# Patient Record
Sex: FEMALE | Race: OTHER | HISPANIC OR LATINO | Employment: UNEMPLOYED | URBAN - METROPOLITAN AREA
[De-identification: names, ages, dates, MRNs, and addresses within clinical notes are randomized per-mention and may not be internally consistent; named-entity substitution may affect disease eponyms.]

---

## 2018-07-16 ENCOUNTER — TELEPHONE (OUTPATIENT)
Dept: FAMILY MEDICINE CLINIC | Facility: CLINIC | Age: 48
End: 2018-07-16

## 2018-07-16 DIAGNOSIS — Z12.31 ENCOUNTER FOR SCREENING MAMMOGRAM FOR MALIGNANT NEOPLASM OF BREAST: Primary | ICD-10-CM

## 2018-07-17 DIAGNOSIS — Z12.31 SCREENING MAMMOGRAM, ENCOUNTER FOR: Primary | ICD-10-CM

## 2018-07-18 ENCOUNTER — TELEPHONE (OUTPATIENT)
Dept: FAMILY MEDICINE CLINIC | Facility: CLINIC | Age: 48
End: 2018-07-18

## 2018-07-23 ENCOUNTER — TRANSCRIBE ORDERS (OUTPATIENT)
Dept: ADMINISTRATIVE | Facility: HOSPITAL | Age: 48
End: 2018-07-23

## 2018-07-23 ENCOUNTER — OFFICE VISIT (OUTPATIENT)
Dept: OBGYN CLINIC | Facility: CLINIC | Age: 48
End: 2018-07-23
Payer: COMMERCIAL

## 2018-07-23 ENCOUNTER — APPOINTMENT (OUTPATIENT)
Dept: LAB | Facility: HOSPITAL | Age: 48
End: 2018-07-23
Attending: OBSTETRICS & GYNECOLOGY
Payer: COMMERCIAL

## 2018-07-23 VITALS
DIASTOLIC BLOOD PRESSURE: 66 MMHG | SYSTOLIC BLOOD PRESSURE: 118 MMHG | BODY MASS INDEX: 27.01 KG/M2 | WEIGHT: 137.6 LBS | HEIGHT: 60 IN

## 2018-07-23 DIAGNOSIS — Z12.4 CERVICAL CANCER SCREENING: ICD-10-CM

## 2018-07-23 DIAGNOSIS — Z12.31 ENCOUNTER FOR SCREENING MAMMOGRAM FOR BREAST CANCER: ICD-10-CM

## 2018-07-23 DIAGNOSIS — N92.1 MENORRHAGIA WITH IRREGULAR CYCLE: ICD-10-CM

## 2018-07-23 DIAGNOSIS — R23.2 HOT FLASHES: ICD-10-CM

## 2018-07-23 DIAGNOSIS — Z11.51 ENCOUNTER FOR SCREENING FOR HUMAN PAPILLOMAVIRUS (HPV): ICD-10-CM

## 2018-07-23 DIAGNOSIS — N64.4 PAIN OF RIGHT BREAST: ICD-10-CM

## 2018-07-23 DIAGNOSIS — Z01.419 ENCOUNTER FOR GYNECOLOGICAL EXAMINATION (GENERAL) (ROUTINE) WITHOUT ABNORMAL FINDINGS: ICD-10-CM

## 2018-07-23 LAB
B-HCG SERPL-ACNC: <3 MIU/ML
ERYTHROCYTE [DISTWIDTH] IN BLOOD BY AUTOMATED COUNT: 13 %
FSH SERPL-ACNC: 2.7 MIU/ML
HCT VFR BLD AUTO: 40.3 % (ref 36–46)
HGB BLD-MCNC: 13.7 G/DL (ref 12–16)
MCH RBC QN AUTO: 30.6 PG (ref 26–34)
MCHC RBC AUTO-ENTMCNC: 33.9 G/DL (ref 31–36)
MCV RBC AUTO: 90 FL (ref 80–100)
PLATELET # BLD AUTO: 240 THOUSANDS/UL (ref 150–450)
PMV BLD AUTO: 8.8 FL (ref 8.9–12.7)
RBC # BLD AUTO: 4.47 MILLION/UL (ref 4–5.2)
TSH SERPL DL<=0.05 MIU/L-ACNC: 0.73 UIU/ML (ref 0.47–4.68)
WBC # BLD AUTO: 5 THOUSAND/UL (ref 4.5–11)

## 2018-07-23 PROCEDURE — 83001 ASSAY OF GONADOTROPIN (FSH): CPT

## 2018-07-23 PROCEDURE — 85027 COMPLETE CBC AUTOMATED: CPT

## 2018-07-23 PROCEDURE — S0610 ANNUAL GYNECOLOGICAL EXAMINA: HCPCS | Performed by: OBSTETRICS & GYNECOLOGY

## 2018-07-23 PROCEDURE — 84702 CHORIONIC GONADOTROPIN TEST: CPT

## 2018-07-23 PROCEDURE — 84443 ASSAY THYROID STIM HORMONE: CPT

## 2018-07-23 PROCEDURE — 36415 COLL VENOUS BLD VENIPUNCTURE: CPT

## 2018-07-23 RX ORDER — VALACYCLOVIR HYDROCHLORIDE 1 G/1
1000 TABLET, FILM COATED ORAL 2 TIMES DAILY
COMMUNITY
End: 2018-09-19 | Stop reason: SDUPTHER

## 2018-07-23 NOTE — PATIENT INSTRUCTIONS
Endometrial Biopsy   WHAT YOU NEED TO KNOW:   Endometrial biopsy is a procedure to remove a tissue sample from the lining of your uterus  This procedure is done through your vagina  DISCHARGE INSTRUCTIONS:   Medicines: The following medicines may be ordered for you:  · NSAIDs , such as ibuprofen, help decrease swelling, pain, and fever  This medicine is available with or without a doctor's order  NSAIDs can cause stomach bleeding or kidney problems in certain people  If you take blood thinner medicine, always ask your healthcare provider if NSAIDs are safe for you  Always read the medicine label and follow directions  · Take your medicine as directed  Contact your healthcare provider if you think your medicine is not helping or if you have side effects  Tell him or her if you are allergic to any medicine  Keep a list of the medicines, vitamins, and herbs you take  Include the amounts, and when and why you take them  Bring the list or the pill bottles to follow-up visits  Carry your medicine list with you in case of an emergency  Follow up with your healthcare provider or gynecologist as directed: You may need to return for more tests  Write down your questions so you remember to ask them during your visits  Self-care:   · You may have mild pain, cramping, or spotting for a few days after your procedure  · Avoid sex, douching, or tampon use for 10 to 14 days or as directed by your healthcare provider  Contact your healthcare provider or gynecologist if:   · You have a fever  · You have pain or cramping lasts longer than a few days  · You have white or yellow vaginal discharge  · You have more vaginal bleeding than you were told to expect  · You have questions or concerns about your condition or care  Seek care immediately or call 911 if:   · You have severe pain that does not go away after you take pain medicine      © 2017 [employer] Information is for End User's use only and may not be sold, redistributed or otherwise used for commercial purposes  All illustrations and images included in CareNotes® are the copyrighted property of [employer]  or Jonas Perdue  The above information is an  only  It is not intended as medical advice for individual conditions or treatments  Talk to your doctor, nurse or pharmacist before following any medical regimen to see if it is safe and effective for you  Breast Self Exam for Women   WHAT YOU NEED TO KNOW:   A BSE is a way to check your breasts for lumps and other changes  Regular BSEs can help you know how your breasts normally look and feel  Most breast lumps or changes are not cancer, but you should always have them checked by a healthcare provider  Your healthcare provider can also watch you do a BSE and can tell you if you are doing your BSE correctly  DISCHARGE INSTRUCTIONS:   Contact your healthcare provider if:   · You find any lumps or changes in your breasts  · You have breast pain or fluid coming from your nipples  · You have questions or concerns about your condition or care  Why you should do a BSE:  Breast cancer is the most common type of cancer in women  Even if you have mammograms, you may still want to do a BSE regularly  If you know how your breasts normally feel and look, it may help you know when to contact your healthcare provider  Mammograms can miss some cancers  You may find a lump during a BSE that did not show up on your mammogram   When you should do a BSE:  Joann Mossond your calendar to help you remember to do BSE on a regular schedule  One easy way to remember to do a BSE is to do the exam on the same day of each month  If you have periods, you may want to do your BSE 1 week after your period ends  This is the time when your breasts may be the least swollen, lumpy, or tender  You can do regular BSEs even if you are breastfeeding or have breast implants     How to do a BSE:   · Look at your breasts in a mirror  Look at the size and shape of each breast and nipple  Check for swelling, lumps, dimpling, scaly skin, or other skin changes  Look for nipple changes, such as a nipple that is painful or beginning to pull inward  Gently squeeze both nipples and check to see if fluid (that is not breast milk) comes out of them  If you find any of these or other breast changes, contact your healthcare provider  Check your breasts while you sit or  the following 3 positions:    [facility] your arms down at your sides  ¨ Raise your hands and join them behind your head  ¨ Put firm pressure with your hands on your hips  Bend slightly forward while you look at your breasts in the mirror  · Lie down and feel your breasts  When you lie down, your breast tissue spreads out evenly over your chest  This makes it easier for you to feel for lumps and anything that may not be normal for your breasts  Do a BSE on one breast at a time  ¨ Place a small pillow or towel under your left shoulder  Put your left arm behind your head  ¨ Use the 3 middle fingers of your right hand  Use your fingertip pads, on the top of your fingers  Your fingertip pad is the most sensitive part of your finger  ¨ Use small circles to feel your breast tissue  Use your fingertip pads to make dime-sized, overlapping circles on your breast and armpits  Use light, medium, and firm pressure  First, press lightly  Second, press with medium pressure to feel a little deeper into the breast  Last, use firm pressure to feel deep within your breast     ¨ Examine your entire breast area  Examine the breast area from above the breast to below the breast where you feel only ribs  Make small circles with your fingertips, starting in the middle of your armpit  Make circles going up and down the breast area  Continue toward your breast and all the way across it   Examine the area from your armpit all the way over to the middle of your chest (breastbone)  Stop at the middle of your chest     ¨ Move the pillow or towel to your right shoulder, and put your right arm behind your head  Use the 3 fingertip pads of your left hand, and repeat the above steps to do a BSE on your right breast        What else you can do to check for breast problems or cancer:  Some experts suggest that women 36years of age or older should have a mammogram every year  Other experts suggest that women between the ages of 48and 76years old should have a mammogram every 2 years  Talk to your healthcare provider about when you should have a mammogram   Follow up with your healthcare provider as directed: Your healthcare provider can watch you and tell you if you are doing your BSE correctly  Write down your questions so you remember to ask them during your visits  © 2017 [address]  Information is for End User's use only and may not be sold, redistributed or otherwise used for commercial purposes  All illustrations and images included in CareNotes® are the copyrighted property of A D A M , Inc  or Jonas Perdue  The above information is an  only  It is not intended as medical advice for individual conditions or treatments  Talk to your doctor, nurse or pharmacist before following any medical regimen to see if it is safe and effective for you  Wellness Visit for Adults   AMBULATORY CARE:   A wellness visit  is when you see your healthcare provider to get screened for health problems  You can also get advice on how to stay healthy  Write down your questions so you remember to ask them  Ask your healthcare provider how often you should have a wellness visit  What happens at a wellness visit:  Your healthcare provider will ask about your health, and your family history of health problems  This includes high blood pressure, heart disease, and cancer   He or she will ask if you have symptoms that concern you, if you smoke, and about your mood  You may also be asked about your intake of medicines, supplements, food, and alcohol  Any of the following may be done:  · Your weight  will be checked  Your height may also be checked so your body mass index (BMI) can be calculated  Your BMI shows if you are at a healthy weight  · Your blood pressure  and heart rate will be checked  Your temperature may also be checked  · Blood and urine tests  may be done  Blood tests may be done to check your cholesterol levels  Abnormal cholesterol levels increase your risk for heart disease and stroke  You may also need a blood or urine test to check for diabetes if you are at increased risk  Urine tests may be done to look for signs of an infection or kidney disease  · A physical exam  includes checking your heartbeat and lungs with a stethoscope  Your healthcare provider may also check your skin to look for sun damage  · Screening tests  may be recommended  A screening test is done to check for diseases that may not cause symptoms  The screening tests you may need depend on your age, gender, family history, and lifestyle habits  For example, colorectal screening may be recommended if you are 48years old or older  Screening tests you need if you are a woman:   · A Pap smear  is used to screen for cervical cancer  Pap smears are usually done every 3 to 5 years depending on your age  You may need them more often if you have had abnormal Pap smear test results in the past  Ask your healthcare provider how often you should have a Pap smear  · A mammogram  is an x-ray of your breasts to screen for breast cancer  Experts recommend mammograms every 2 years starting at age 48 years  You may need a mammogram at age 52 years or younger if you have an increased risk for breast cancer  Talk to your healthcare provider about when you should start having mammograms and how often you need them    Vaccines you may need:   · Get an influenza vaccine  every year  The influenza vaccine protects you from the flu  Several types of viruses cause the flu  The viruses change over time, so new vaccines are made each year  · Get a tetanus-diphtheria (Td) booster vaccine  every 10 years  This vaccine protects you against tetanus and diphtheria  Tetanus is a severe infection that may cause painful muscle spasms and lockjaw  Diphtheria is a severe bacterial infection that causes a thick covering in the back of your mouth and throat  · Get a human papillomavirus (HPV) vaccine  if you are female and aged 23 to 32 or male 23 to 24 and never received it  This vaccine protects you from HPV infection  HPV is the most common infection spread by sexual contact  HPV may also cause vaginal, penile, and anal cancers  · Get a pneumococcal vaccine  if you are aged 72 years or older  The pneumococcal vaccine is an injection given to protect you from pneumococcal disease  Pneumococcal disease is an infection caused by pneumococcal bacteria  The infection may cause pneumonia, meningitis, or an ear infection  · Get a shingles vaccine  if you are aged 61 or older, even if you have had shingles before  The shingles vaccine is an injection to protect you from the varicella-zoster virus  This is the same virus that causes chickenpox  Shingles is a painful rash that develops in people who had chickenpox or have been exposed to the virus  How to eat healthy:  My Plate is a model for planning healthy meals  It shows the types and amounts of foods that should go on your plate  Fruits and vegetables make up about half of your plate, and grains and protein make up the other half  A serving of dairy is included on the side of your plate  The amount of calories and serving sizes you need depends on your age, gender, weight, and height  Examples of healthy foods are listed below:  · Eat a variety of vegetables  such as dark green, red, and orange vegetables   You can also include canned vegetables low in sodium (salt) and frozen vegetables without added butter or sauces  · Eat a variety of fresh fruits , canned fruit in 100% juice, frozen fruit, and dried fruit  · Include whole grains  At least half of the grains you eat should be whole grains  Examples include whole-wheat bread, wheat pasta, brown rice, and whole-grain cereals such as oatmeal     · Eat a variety of protein foods such as seafood (fish and shellfish), lean meat, and poultry without skin (turkey and chicken)  Examples of lean meats include pork leg, shoulder, or tenderloin, and beef round, sirloin, tenderloin, and extra lean ground beef  Other protein foods include eggs and egg substitutes, beans, peas, soy products, nuts, and seeds  · Choose low-fat dairy products such as skim or 1% milk or low-fat yogurt, cheese, and cottage cheese  · Limit unhealthy fats  such as butter, hard margarine, and shortening  Exercise:  Exercise at least 30 minutes per day on most days of the week  Some examples of exercise include walking, biking, dancing, and swimming  You can also fit in more physical activity by taking the stairs instead of the elevator or parking farther away from stores  Include muscle strengthening activities 2 days each week  Regular exercise provides many health benefits  It helps you manage your weight, and decreases your risk for type 2 diabetes, heart disease, stroke, and high blood pressure  Exercise can also help improve your mood  Ask your healthcare provider about the best exercise plan for you  General health and safety guidelines:   · Do not smoke  Nicotine and other chemicals in cigarettes and cigars can cause lung damage  Ask your healthcare provider for information if you currently smoke and need help to quit  E-cigarettes or smokeless tobacco still contain nicotine  Talk to your healthcare provider before you use these products  · Limit alcohol    A drink of alcohol is 12 ounces of beer, 5 ounces of wine, or 1½ ounces of liquor  · Lose weight, if needed  Being overweight increases your risk of certain health conditions  These include heart disease, high blood pressure, type 2 diabetes, and certain types of cancer  · Protect your skin  Do not sunbathe or use tanning beds  Use sunscreen with a SPF 15 or higher  Apply sunscreen at least 15 minutes before you go outside  Reapply sunscreen every 2 hours  Wear protective clothing, hats, and sunglasses when you are outside  · Drive safely  Always wear your seatbelt  Make sure everyone in your car wears a seatbelt  A seatbelt can save your life if you are in an accident  Do not use your cell phone when you are driving  This could distract you and cause an accident  Pull over if you need to make a call or send a text message  · Practice safe sex  Use latex condoms if are sexually active and have more than one partner  Your healthcare provider may recommend screening tests for sexually transmitted infections (STIs)  · Wear helmets, lifejackets, and protective gear  Always wear a helmet when you ride a bike or motorcycle, go skiing, or play sports that could cause a head injury  Wear protective equipment when you play sports  Wear a lifejacket when you are on a boat or doing water sports  © 2017 [address]  Information is for End User's use only and may not be sold, redistributed or otherwise used for commercial purposes  All illustrations and images included in CareNotes® are the copyrighted property of A D A M , Inc  or [address]  The above information is an  only  It is not intended as medical advice for individual conditions or treatments  Talk to your doctor, nurse or pharmacist before following any medical regimen to see if it is safe and effective for you

## 2018-07-23 NOTE — PROGRESS NOTES
Assessment        Diagnoses and all orders for this visit:    Encounter for gynecological examination (general) (routine) without abnormal findings    Menorrhagia with irregular cycle  -     US pelvis complete w transvaginal; Future  -     CBC; Future  -     hCG, quantitative; Future  -     TSH, 3rd generation with Free T4 reflex; Future    Encounter for screening mammogram for breast cancer  -     Mammo screening left w cad; Future    Encounter for screening for human papillomavirus (HPV)    Cervical cancer screening    Pain of right breast  -     Mammo diagnostic right w cad; Future    Hot flashes  -     Follicle stimulating hormone; Future    Other orders  -     valACYclovir (VALTREX) 1,000 mg tablet; Take 1,000 mg by mouth 2 (two) times a day                  Plan      All questions answered  Await pap smear results  Blood tests: CBC with diff, FSH, Quantitative hCG and TSH  Breast self exam technique reviewed and patient encouraged to perform self-exam monthly  Endocervical curettage  Endometrial biopsy - see separate procedure note  Mammogram   Pelvic ultrasound  Thin prep Pap smear  Currently, patient is currently not bleeding  Discussed with patient medical versus surgical therapy for abnormal uterine bleeding after her work-up  Patient currently does have hot flashes and night sweats  We ordered an [facility]      Nany Forte is a 50 y o  female who presents for annual exam   She complains of irregular periods  She states that she had a  Previously 3 times per month  She also complains of pain associated with her periods  She is also having right-sided breast pain  Last PAP: 5/25/15  Last Mammo: 2 years    Current contraception: tubal ligation  History of abnormal Pap smear: no  Patient had a pelvic ultrasound in September 2017 at [facility]   She had a sonohysterography performed    Patient had a a uterus measuring 7 54 x 5 28 x 3 95 cm with a homogeneous myometrium  Both ovaries were normal   No fibroids or endometrial polyps were noted  She had an endometrial biopsy in 2015 that was normal     Menstrual History:  OB History      Para Term  AB Living    3 1 1 0 2 1    SAB TAB Ectopic Multiple Live Births    0 1 0 0 1           Patient's last menstrual period was 2018 (exact date)  The following portions of the patient's history were reviewed and updated as appropriate: allergies, current medications, past family history, past medical history, past social history, past surgical history and problem list         Review of Systems   Constitutional: Negative  HENT: Negative  Eyes: Negative  Respiratory: Negative  Cardiovascular: Negative  Gastrointestinal: Negative  Endocrine: Negative  Genitourinary:        As noted in HPI   Musculoskeletal: Negative  Skin: Negative  Allergic/Immunologic: Negative  Neurological: Negative  Hematological: Negative  Psychiatric/Behavioral: Negative  Physical Exam   Constitutional: She is oriented to person, place, and time  She appears well-developed and well-nourished  Genitourinary: There is no rash or lesion on the right labia  There is no rash or lesion on the left labia  No bleeding in the vagina  No vaginal discharge found  Right adnexum does not display mass, does not display tenderness and does not display fullness  Left adnexum does not display mass, does not display tenderness and does not display fullness  Cervix does not exhibit motion tenderness, lesion or polyp  Uterus is anteverted  Uterus is not enlarged or tender  HENT:   Head: Normocephalic  Neck: No thyromegaly present  Cardiovascular: Normal rate, regular rhythm and normal heart sounds  No murmur heard  Pulmonary/Chest: Effort normal and breath sounds normal  No respiratory distress  She has no wheezes  She has no rales   Right breast exhibits no mass, no nipple discharge, no skin change and no tenderness  Left breast exhibits no mass, no nipple discharge, no skin change and no tenderness  Abdominal: Soft  She exhibits no distension and no mass  There is no tenderness  There is no rebound and no guarding  Musculoskeletal: She exhibits no edema or tenderness  Neurological: She is alert and oriented to person, place, and time  Skin: Skin is warm and dry  Psychiatric: She has a normal mood and affect

## 2018-07-25 DIAGNOSIS — Z12.31 SCREENING MAMMOGRAM, ENCOUNTER FOR: Primary | ICD-10-CM

## 2018-07-27 LAB
CYTOLOGIST CVX/VAG CYTO: NORMAL
DX ICD CODE: NORMAL
OTHER STN SPEC: NORMAL
OTHER STN SPEC: NORMAL
PATH REPORT.FINAL DX SPEC: NORMAL
SL AMB NOTE:: NORMAL
SL AMB SPECIMEN ADEQUACY: NORMAL
SL AMB TEST METHODOLOGY: NORMAL

## 2018-08-03 ENCOUNTER — HOSPITAL ENCOUNTER (EMERGENCY)
Facility: HOSPITAL | Age: 48
Discharge: HOME | End: 2018-08-03
Attending: EMERGENCY MEDICINE | Admitting: EMERGENCY MEDICINE
Payer: COMMERCIAL

## 2018-08-03 ENCOUNTER — APPOINTMENT (EMERGENCY)
Dept: RADIOLOGY | Facility: HOSPITAL | Age: 48
End: 2018-08-03
Payer: COMMERCIAL

## 2018-08-03 VITALS
HEIGHT: 60 IN | DIASTOLIC BLOOD PRESSURE: 72 MMHG | HEART RATE: 88 BPM | OXYGEN SATURATION: 100 % | RESPIRATION RATE: 16 BRPM | BODY MASS INDEX: 27.44 KG/M2 | TEMPERATURE: 98.5 F | WEIGHT: 139.77 LBS | SYSTOLIC BLOOD PRESSURE: 105 MMHG

## 2018-08-03 DIAGNOSIS — S92.919A TOE FRACTURE: Primary | ICD-10-CM

## 2018-08-03 PROCEDURE — 99283 EMERGENCY DEPT VISIT LOW MDM: CPT

## 2018-08-03 PROCEDURE — 73630 X-RAY EXAM OF FOOT: CPT

## 2018-08-03 RX ORDER — IBUPROFEN 600 MG/1
600 TABLET ORAL EVERY 6 HOURS PRN
Qty: 30 TABLET | Refills: 0 | Status: SHIPPED | OUTPATIENT
Start: 2018-08-03 | End: 2018-10-11 | Stop reason: HOSPADM

## 2018-08-03 RX ORDER — ACETAMINOPHEN 325 MG/1
TABLET ORAL
Status: COMPLETED
Start: 2018-08-03 | End: 2018-08-03

## 2018-08-03 RX ORDER — ACETAMINOPHEN 325 MG/1
650 TABLET ORAL ONCE
Status: COMPLETED | OUTPATIENT
Start: 2018-08-03 | End: 2018-08-03

## 2018-08-03 RX ORDER — VALACYCLOVIR HYDROCHLORIDE 500 MG/1
TABLET, FILM COATED ORAL 3 TIMES DAILY
COMMUNITY
Start: 2018-04-04 | End: 2018-09-19 | Stop reason: ALTCHOICE

## 2018-08-03 RX ADMIN — ACETAMINOPHEN 650 MG: 325 TABLET ORAL at 21:18

## 2018-08-04 NOTE — DISCHARGE INSTRUCTIONS
Toe Fracture   WHAT YOU NEED TO KNOW:   A toe fracture is a break in 1 or more of the bones in your toe  It is most commonly caused by a direct blow to the toe  The bones in your toe may just be broken, or they may be out of place or   DISCHARGE INSTRUCTIONS:   Return to the emergency department if:   · Blood soaks through your bandage  · You have severe pain in your toe  · Your toe is cold or numb  Contact your healthcare provider if:   · You have a fever  · Your pain does not go away, even after treatment  · Your toe continues to hurt even after it has healed  · You have questions or concerns about your condition or care  Medicines: You may need any of the following:  · NSAIDs , such as ibuprofen, help decrease swelling, pain, and fever  This medicine is available with or without a doctor's order  NSAIDs can cause stomach bleeding or kidney problems in certain people  If you take blood thinner medicine, always ask your healthcare provider if NSAIDs are safe for you  Always read the medicine label and follow directions  · Prescription pain medicine  may be given  Ask your healthcare provider how to take this medicine safely  Some prescription pain medicines contain acetaminophen  Do not take other medicines that contain acetaminophen without talking to your healthcare provider  Too much acetaminophen may cause liver damage  Prescription pain medicine may cause constipation  Ask your healthcare provider how to prevent or treat constipation  · Antibiotics  treat a bacterial infection  You may need antibiotics if you have an open wound  · Take your medicine as directed  Contact your healthcare provider if you think your medicine is not helping or if you have side effects  Tell him of her if you are allergic to any medicine  Keep a list of the medicines, vitamins, and herbs you take  Include the amounts, and when and why you take them   Bring the list or the pill bottles to follow-up visits  Carry your medicine list with you in case of an emergency  Self-care:   · Use vimal tape, an elastic bandage, or a splint as directed  These help keep your toe in its correct position as it heals  Vimal tape is when your fractured toe and the toe next to it are taped together  · Use support devices  including a cane, crutches, walking boot, or hard soled shoe as directed  These help protect your broken toe and limit movement so it can heal     · Rest  your toe so that it can heal  Return to normal activities as directed  · Apply ice  on your toe for 15 to 20 minutes every hour or as directed  Use an ice pack, or put crushed ice in a plastic bag  Cover it with a towel  Ice helps prevent tissue damage and decreases swelling and pain  · Elevate  your toe above the level of your heart as often as you can  This will help decrease swelling and pain  Prop your toe on pillows or blankets to keep it elevated comfortably  Follow up with your healthcare provider as directed: You may need to see a podiatrist in 1 to 2 weeks  Write down your questions so you remember to ask them during your visits  © 2017 [address] Information is for End User's use only and may not be sold, redistributed or otherwise used for commercial purposes  All illustrations and images included in CareNotes® are the copyrighted property of [employer]  or Jonas Perdue  The above information is an  only  It is not intended as medical advice for individual conditions or treatments  Talk to your doctor, nurse or pharmacist before following any medical regimen to see if it is safe and effective for you

## 2018-08-04 NOTE — ED PROVIDER NOTES
History  Chief Complaint   Patient presents with    Toe Injury     "I have a toe injury, right foot fifth toe  I was walking and hit the toe on a chair  Injury occurred 1 week ago and the pain has gotten worse since then "       History provided by:  Patient   used: No    Medical Problem   Location:  Pt stubbed right 5th toe 1 week ago on furniture  Severity:  Mild  Onset quality:  Gradual  Duration:  1 week  Timing:  Constant  Progression:  Waxing and waning  Chronicity:  New  Associated symptoms: no abdominal pain, no chest pain, no congestion, no cough, no diarrhea, no ear pain, no fatigue, no fever, no headaches, no loss of consciousness, no myalgias, no nausea, no rash, no rhinorrhea, no shortness of breath, no sore throat, no vomiting and no wheezing        Prior to Admission Medications   Prescriptions Last Dose Informant Patient Reported? Taking? Ergocalciferol (VITAMIN D2 PO)   Yes Yes   Sig: take 1 capsule by oral route  every week   diclofenac sodium (VOLTAREN) 1 %   Yes Yes   Sig: apply (2G)  by topical route 3 times every day to the left knee   valACYclovir (VALTREX) 1,000 mg tablet   Yes No   Sig: Take 1,000 mg by mouth 2 (two) times a day   valACYclovir (VALTREX) 500 mg tablet   Yes Yes   Sig: 3 (three) times a day      Facility-Administered Medications: None       Past Medical History:   Diagnosis Date    Herpes        Past Surgical History:   Procedure Laterality Date    BREAST SURGERY Bilateral 2012    breast lift       Family History   Problem Relation Age of Onset    No Known Problems Mother     No Known Problems Father      I have reviewed and agree with the history as documented  Social History   Substance Use Topics    Smoking status: Never Smoker    Smokeless tobacco: Never Used    Alcohol use No        Review of Systems   Constitutional: Negative  Negative for fatigue and fever  HENT: Negative    Negative for congestion, ear pain, rhinorrhea and sore throat  Eyes: Negative  Respiratory: Negative  Negative for cough, shortness of breath and wheezing  Cardiovascular: Negative  Negative for chest pain  Gastrointestinal: Negative  Negative for abdominal pain, diarrhea, nausea and vomiting  Endocrine: Negative  Genitourinary: Negative  Musculoskeletal: Negative  Negative for myalgias  Right 5th toe pain    Skin: Negative  Negative for rash  Allergic/Immunologic: Negative  Neurological: Negative  Negative for loss of consciousness and headaches  Hematological: Negative  Psychiatric/Behavioral: Negative  All other systems reviewed and are negative  Physical Exam  Physical Exam   Constitutional: She is oriented to person, place, and time  She appears well-developed and well-nourished  HENT:   Head: Normocephalic  Right Ear: External ear normal    Left Ear: External ear normal    Nose: Nose normal    Mouth/Throat: Oropharynx is clear and moist    Eyes: Conjunctivae and EOM are normal  Pupils are equal, round, and reactive to light  Neck: Normal range of motion  Neck supple  Cardiovascular: Normal rate, regular rhythm and normal heart sounds  Pulmonary/Chest: Effort normal    Abdominal: Soft  Musculoskeletal:   Right 5th toe swelling and pain     Neurological: She is alert and oriented to person, place, and time  Skin: Skin is warm  Psychiatric: She has a normal mood and affect  Her behavior is normal  Judgment and thought content normal    Nursing note and vitals reviewed        Vital Signs  ED Triage Vitals [08/03/18 2105]   Temperature Pulse Respirations Blood Pressure SpO2   98 5 °F (36 9 °C) 88 16 105/72 100 %      Temp Source Heart Rate Source Patient Position - Orthostatic VS BP Location FiO2 (%)   Temporal Monitor Sitting Left arm --      Pain Score       9           Vitals:    08/03/18 2105   BP: 105/72   Pulse: 88   Patient Position - Orthostatic VS: Sitting       Visual Acuity      ED Medications  Medications   acetaminophen (TYLENOL) tablet 650 mg (650 mg Oral Given 8/3/18 2118)       Diagnostic Studies  Results Reviewed     None                 XR foot 3+ views RIGHT    (Results Pending)              Procedures  Procedures       Phone Contacts  ED Phone Contact    ED Course                               MDM  CritCare Time    Disposition  Final diagnoses: Toe fracture     Time reflects when diagnosis was documented in both MDM as applicable and the Disposition within this note     Time User Action Codes Description Comment    8/3/2018  9:35 PM [address] [V07 [id number]] Toe fracture       ED Disposition     ED Disposition Condition Comment    Discharge  Regina Gonzalez discharge to home/self care  Condition at discharge: Good        Follow-up Information     Follow up With Specialties Details Why [address], [address] Medicine Schedule an appointment as soon as possible for a visit  [address], [address] [facility] [address]  [facility]  12  Special[facility]   [phone] [facility] [address] [phone]          Patient's Medications   Discharge Prescriptions    IBUPROFEN (MOTRIN) 600 MG TABLET    Take 1 tablet (600 mg total) by mouth every 6 (six) hours as needed (pain)       Start Date: 8/3/2018  End Date: --       Order Dose: 600 mg       Quantity: 30 tablet    Refills: 0     No discharge procedures on file      ED Provider  Electronically Signed by           Keri Bruce PA-C  08/03/18 3459

## 2018-08-07 ENCOUNTER — HOSPITAL ENCOUNTER (OUTPATIENT)
Dept: ULTRASOUND IMAGING | Facility: HOSPITAL | Age: 48
Discharge: HOME | End: 2018-08-07
Payer: COMMERCIAL

## 2018-08-07 ENCOUNTER — HOSPITAL ENCOUNTER (OUTPATIENT)
Dept: MAMMOGRAPHY | Facility: CLINIC | Age: 48
Discharge: HOME | End: 2018-08-07
Payer: COMMERCIAL

## 2018-08-07 ENCOUNTER — APPOINTMENT (OUTPATIENT)
Dept: LAB | Facility: HOSPITAL | Age: 48
End: 2018-08-07
Attending: OBSTETRICS & GYNECOLOGY
Payer: COMMERCIAL

## 2018-08-07 ENCOUNTER — OFFICE VISIT (OUTPATIENT)
Dept: OBGYN CLINIC | Facility: CLINIC | Age: 48
End: 2018-08-07
Payer: COMMERCIAL

## 2018-08-07 VITALS
WEIGHT: 136.6 LBS | HEIGHT: 60 IN | BODY MASS INDEX: 26.82 KG/M2 | DIASTOLIC BLOOD PRESSURE: 70 MMHG | SYSTOLIC BLOOD PRESSURE: 112 MMHG

## 2018-08-07 DIAGNOSIS — N64.4 BREAST PAIN: ICD-10-CM

## 2018-08-07 DIAGNOSIS — Z12.31 SCREENING MAMMOGRAM, ENCOUNTER FOR: ICD-10-CM

## 2018-08-07 DIAGNOSIS — N83.202 CYST OF LEFT OVARY: ICD-10-CM

## 2018-08-07 DIAGNOSIS — N93.9 ABNORMAL UTERINE BLEEDING: Primary | ICD-10-CM

## 2018-08-07 LAB — CANCER AG125 SERPL-ACNC: 3.7 U/ML (ref 0–30)

## 2018-08-07 PROCEDURE — 88305 TISSUE EXAM BY PATHOLOGIST: CPT | Performed by: PATHOLOGY

## 2018-08-07 PROCEDURE — 88344 IMHCHEM/IMCYTCHM EA MLT ANTB: CPT | Performed by: PATHOLOGY

## 2018-08-07 PROCEDURE — 76642 ULTRASOUND BREAST LIMITED: CPT

## 2018-08-07 PROCEDURE — 36415 COLL VENOUS BLD VENIPUNCTURE: CPT

## 2018-08-07 PROCEDURE — 99213 OFFICE O/P EST LOW 20 MIN: CPT | Performed by: OBSTETRICS & GYNECOLOGY

## 2018-08-07 PROCEDURE — 86304 IMMUNOASSAY TUMOR CA 125: CPT

## 2018-08-07 PROCEDURE — 77066 DX MAMMO INCL CAD BI: CPT

## 2018-08-07 PROCEDURE — 58100 BIOPSY OF UTERUS LINING: CPT | Performed by: OBSTETRICS & GYNECOLOGY

## 2018-08-07 NOTE — PROGRESS NOTES
Assessment/Plan     Diagnoses and all orders for this visit:    Abnormal uterine bleeding  -     Cancel: Endometrial biopsy  -     Pathology Report  -     Tissue Exam    Cyst of left ovary  -     ; Future  -     US pelvis complete w transvaginal; Future            Follow-up in 2 weeks to discuss laboratory testing endometrial biopsy results  Asked patient to have a repeat pelvic ultrasound in 6-8 weeks to follow up ovarian cyst    was also ordered due to complex nature of ovarian cyst         Donato Senior is an 50 y o  woman who presents for discussion of results  She has no new complaints today  patient was seen several weeks ago for abnormal and irregular periods  She had a  Previously 3 times per month  She also complained of pain associated with periods  Pelvic ultrasound was ordered today  She had laboratory testing that was normal HCG was negative thyroid test was normal CBC showed normal hemoglobin of 13 7  FSH is 2 7 which is not menopausal   ultrasound shows a cyst on the left  OB History      Para Term  AB Living    3 1 1 0 2 1    SAB TAB Ectopic Multiple Live Births    0 1 0 0 1          The following portions of the patient's history were reviewed and updated as appropriate: allergies, current medications, past family history, past medical history, past social history, past surgical history and problem list       Review of Systems   Constitutional: Negative  HENT: Negative  Eyes: Negative  Respiratory: Negative  Cardiovascular: Negative  Gastrointestinal: Negative  Endocrine: Negative  Genitourinary:        As noted in HPI   Musculoskeletal: Negative  Skin: Negative  Allergic/Immunologic: Negative  Neurological: Negative  Hematological: Negative  Psychiatric/Behavioral: Negative          Objective    Vitals:    18 0847   BP: 112/70       Physical Exam   Constitutional: She is oriented to person, place, and time  She appears well-developed and well-nourished  No distress  Genitourinary: Uterus normal  Pelvic exam was performed with patient supine  There is no rash, tenderness, lesion or injury on the right labia  There is no rash, tenderness, lesion or injury on the left labia  No erythema, tenderness or bleeding in the vagina  No signs of injury around the vagina  No vaginal discharge found  Right adnexum does not display mass, does not display tenderness and does not display fullness  Left adnexum does not display mass, does not display tenderness and does not display fullness  Cervix does not exhibit motion tenderness, lesion, discharge or polyp  Uterus is anteverted  Uterus is not enlarged or tender  Abdominal: Soft  She exhibits no distension  There is no tenderness  Neurological: She is alert and oriented to person, place, and time  Skin: Skin is warm and dry  Psychiatric: She has a normal mood and affect  Her behavior is normal        Endometrial biopsy  Date/Time: 8/7/2018 8:54 AM  Performed by: Marily Anthony  Authorized by: Marily Anthony     Consent:     Consent obtained:  Written    Consent given by:  Patient    Procedural risks discussed:  Bleeding and infection    Patient questions answered: yes      Patient agrees, verbalizes understanding, and wants to proceed: yes    Indication:     Indications: Other disorder of menstruation and other abnormal bleeding from female genital tract    Pre-procedure:     Premeds:  Ibuprofen  Procedure:     A bivalve speculum was placed in the vagina: yes      Cervix cleaned and prepped: yes      Uterus sounded: yes      Uterus sound depth (cm):  7    Curettes used:  1    Specimen collected: specimen collected and sent to pathology      Patient tolerated procedure well with no complications: yes              Counseling / Coordination of Care  Total time spent today30 minutes  minutes    Greater than 50% of total time was spent with the patient and / or family counseling and / or coordination of care

## 2018-08-07 NOTE — PATIENT INSTRUCTIONS
Quiste ovárico   CUIDADO AMBULATORIO:   Un quiste ovárico  es un saco que crece en un ovario  Alicia saco usualmente contiene líquido [place], en ocasiones, puede contener jovani o tejido dentro de [place]  La mayoría de los quistes ováricos no son de gran cuidado y desaparecen en varios meses sin necesidad de tratamiento  Sin embargo, algunos quistes pueden crecer grandes y causar dolor o romperse  Signos y síntomas de un quiste ovárico:  Es posible que usted no sienta ni sepa que tiene un quiste o usted podría presentar cualquiera de los siguientes:  · Dolor severo en la parte inferior del abdomen y en el área pélvica que puede ser emma y súbito o podría sentirse jazmyn un dolor sordo    · Janyce Amen e inflamación en la parte inferior de almaguer abdomen     · Infertilidad (incapacidad de quedar embarazada)    · Periodos menstruales demorados o dolorosos    · Sangrados leves entre en los intervalos de roxann periodos menstruales    · Dolor en la parte inferior del abdomen o dolor pélvico ken el contacto sexual    · Náuseas o vómito  Llame al 911 en [place] de presentar lo siguiente:   · Usted se siente demasiado débil o mareado para ponerse de pie  Busque atención médica de inmediato si:   · Usted tiene dolor abdominal intenso  El dolor podría ser emma y súbito  · Usted tiene fiebre  Pregúntele a almaguer Jannifer Barrette vitaminas y minerales son adecuados para usted  · Roxann períodos son antes o después de tiempo o más dolorosos que de costumbre  · Usted tiene sangrado vaginal que no es de almaguer periodo menstrual     · Usted tiene dolor abdominal todo el tiempo  · Almaguer abdomen se encuentra inflamado  · Usted siente daniel sensación de llenura, opresión o malestar en almaguer abdomen  · Usted tiene dificultad para orinar o vaciar la vejiga completamente  · Usted siente dolor ken el contacto sexual     · Usted pierde peso sin proponérselo  · Usted tiene preguntas o inquietudes acerca de almaguer condición o cuidado    [place] dependerá de [employer], los [employer] exámenes y el tipo de quiste que usted tiene  Es posible que michaels médico espere a que el quiste desaparezca sin necesidad de [place]  Podrían administrarle medicamentos con hormonas, jazmyn píldoras anticonceptivas  Estos medicamentos podrían ayudar a controlar angie periodos menstruales, disminuir el tamaño del quiste y prevenir la formación de nuevos quistes  Usted podría necesitar daniel cirugía para extraerle el quiste  Aplique daniel compresa caliente para reducir dolor y calambres:  Siéntese en la philip del baño en agua tibia o coloque un colchón térmico (a temperatura baja), o daniel botella de [place] sobre michaels abdomen  [place] esto por 15 a 20 minutos cada hora por tantos [employer]  Acuda a angie consultas de control con michaels médico según le indicaron  Anote angie preguntas para que se acuerde de hacerlas ken angie visitas  © 2017 [address] [facility] Information is for End User's use only and may not be sold, redistributed or otherwise used for commercial purposes  All illustrations and images included in CareNotes® are the copyrighted property of A D A M , Inc  or Jonas Perdue  Esta información es sólo para uso en educación  Michaels intención no es darle un consejo médico sobre enfermedades o tratamientos  Colsulte con michaels [place]anka [place] farmacéutico antes de seguir cualquier régimen médico para saber si es seguro y efectivo para usted  Biopsia endometrial   LO QUE NECESITA SABER:   La biopsia endometrial es un procedimiento en el cual se vera daniel muestra de tejido del revestimiento del útero  Alicia procedimiento se hace por la vagina  INSTRUCCIONES SOBRE EL ZAIRE HOSPITALARIA:   Medicamentos:  A usted le pueden  prescribir los siguientes medicamentos:  · AINEs (Analgésicos antiinflamatorios no esteroides) jazmyn el ibuprofeno, ayudan a disminuir la inflamación, el dolor y la fiebre   Alicia medicamento esta disponible con o sin daniel Hennie Kawasaki médica  Los AINEs pueden causar sangrado estomacal o problemas renales en ciertas personas  Si usted vera un medicamento anticoagulante, siempre pregúntele a almaguer médico si los REI son seguros para usted  Siempre armin la etiqueta de jewell medicamento y [place] instrucciones  · [place] angie medicamentos jazmyn se le haya indicado  Consulte con almaguer médico si usted darryl que almaguer medicamento no le está ayudando o si presenta efectos secundarios  Infórmele si es alérgico a cualquier medicamento  Mantenga daniel lista actualizada de los [employer], las vitaminas y los productos herbales que vera  Incluya los siguientes datos de los medicamentos: cantidad, frecuencia y motivo de administración  Traiga con usted la lista o los envases de la píldoras a angie citas de seguimiento  Lleve la lista de los medicamentos con usted en [place] de daniel emergencia  Programe daniel stefanie con almaguer médico o ginecólogo jazmyn se le indique:  Es posible que necesite regresar para que le realicen más exámenes  Anote angie preguntas para que se acuerde de hacerlas ken angie visitas  Cuidados personales:   · Usted podría sentir dolor leve, calambres o incluso tener manchas de [place] por unos días después de almaguer procedimiento  · Evite las [employer], las duchas vaginales o el uso de tampones por 10 a 14 días o según indique almaguer médico   Consulte con almaguer médico o ginecólogo sí:   · Usted tiene fiebre  · Usted tiene dolor o calambres con daniel duración de más de varios días  · Usted tiene secreción vaginal billy o amarilla  · Usted tiene más sangrado vaginal del que se le dijo que era de esperar  · Usted tiene preguntas o inquietudes acerca de almaguer condición o cuidado  Busque atención médica de inmediato o llame al 911 si:   · Usted tiene dolor severo que no desaparece después de thomas medicación para el dolor      © 2017 [address] Information is for End User's use only and may not be sold, redistributed or otherwise used for commercial purposes  All illustrations and images included in CareNotes® are the copyrighted property of A D A M , Inc  or Jonas Perdue  Esta información es sólo para uso en educación  Almaguer intención no es darle un consejo médico sobre enfermedades o tratamientos  Colsulte con almaguer [place] Guess farmacéutico antes de seguir cualquier régimen médico para saber si es seguro y efectivo para usted

## 2018-08-14 NOTE — PROGRESS NOTES
I called the patient, this is a nonworking number  Repeat ultrasound and mammogram was reported by nine follow up in six months

## 2018-08-20 ENCOUNTER — OFFICE VISIT (OUTPATIENT)
Dept: OBGYN CLINIC | Facility: CLINIC | Age: 48
End: 2018-08-20
Payer: COMMERCIAL

## 2018-08-20 VITALS
HEIGHT: 60 IN | SYSTOLIC BLOOD PRESSURE: 117 MMHG | BODY MASS INDEX: 26.9 KG/M2 | WEIGHT: 137 LBS | DIASTOLIC BLOOD PRESSURE: 70 MMHG

## 2018-08-20 DIAGNOSIS — N93.9 ABNORMAL UTERINE BLEEDING: Primary | ICD-10-CM

## 2018-08-20 DIAGNOSIS — N83.202 CYST OF LEFT OVARY: ICD-10-CM

## 2018-08-20 PROCEDURE — 99213 OFFICE O/P EST LOW 20 MIN: CPT | Performed by: OBSTETRICS & GYNECOLOGY

## 2018-08-20 NOTE — PROGRESS NOTES
Assessment/Plan     Diagnoses and all orders for this visit:    Abnormal uterine bleeding    Cyst of left ovary          I advised repeat pelvic ultrasound in about 4 weeks as well as sonohysterography to rule out endometrial polyp  I advised continuation of menstrual calendar for now  If SIS shows endometrial polyp, we can consider hysteroscopy D&C for treatment or further diagnostic testing  Donato Mehta is an 50 y o  woman who presents for discussion of results  She has no new complaints today  Patient was seen previously in the beginning of July for annual exam   At that point she had complained of abnormal and irregular periods  Her pelvic ultrasound was normal showing an endometrial lining of 7 mm  Endometrial biopsy was performed and shows normal proliferative endometrium with features possibly suggesting of a small endometrial polyp  Patient is currently not having abnormal bleeding  She also had a small complex cyst on the left ovary  OB History      Para Term  AB Living    3 1 1 0 2 1    SAB TAB Ectopic Multiple Live Births    0 1 0 0 1          The following portions of the patient's history were reviewed and updated as appropriate: allergies, current medications, past family history, past medical history, past social history, past surgical history and problem list       Review of Systems   Constitutional: Negative  HENT: Negative  Eyes: Negative  Respiratory: Negative  Cardiovascular: Negative  Gastrointestinal: Negative  Endocrine: Negative  Genitourinary:        As noted in HPI   Musculoskeletal: Negative  Skin: Negative  Allergic/Immunologic: Negative  Neurological: Negative  Hematological: Negative  Psychiatric/Behavioral: Negative          Objective    Vitals:    18 1425   BP: 117/70       OBGyn Exam    Awake, alert, oriented and not in acute distress  The remainder of her physical examination was deferred as she was here today for consultation and discussion  Counseling / Coordination of Care  Total time spent today15 minutes  minutes  Greater than 50% of total time was spent with the patient and / or family counseling and / or coordination of care

## 2018-09-19 ENCOUNTER — APPOINTMENT (OUTPATIENT)
Dept: LAB | Facility: HOSPITAL | Age: 48
End: 2018-09-19
Payer: COMMERCIAL

## 2018-09-19 ENCOUNTER — APPOINTMENT (OUTPATIENT)
Dept: LAB | Facility: HOSPITAL | Age: 48
End: 2018-09-19
Attending: OBSTETRICS & GYNECOLOGY
Payer: COMMERCIAL

## 2018-09-19 ENCOUNTER — PROCEDURE VISIT (OUTPATIENT)
Dept: OBGYN CLINIC | Facility: CLINIC | Age: 48
End: 2018-09-19
Payer: COMMERCIAL

## 2018-09-19 ENCOUNTER — OFFICE VISIT (OUTPATIENT)
Dept: FAMILY MEDICINE CLINIC | Facility: CLINIC | Age: 48
End: 2018-09-19
Payer: COMMERCIAL

## 2018-09-19 ENCOUNTER — ULTRASOUND (OUTPATIENT)
Dept: OBGYN CLINIC | Facility: CLINIC | Age: 48
End: 2018-09-19
Payer: COMMERCIAL

## 2018-09-19 VITALS
BODY MASS INDEX: 26.7 KG/M2 | WEIGHT: 136 LBS | SYSTOLIC BLOOD PRESSURE: 106 MMHG | HEIGHT: 60 IN | RESPIRATION RATE: 16 BRPM | OXYGEN SATURATION: 98 % | HEART RATE: 89 BPM | DIASTOLIC BLOOD PRESSURE: 70 MMHG | TEMPERATURE: 98 F

## 2018-09-19 DIAGNOSIS — E87.6 HYPOKALEMIA: ICD-10-CM

## 2018-09-19 DIAGNOSIS — D25.0 SUBMUCOUS LEIOMYOMA OF UTERUS: ICD-10-CM

## 2018-09-19 DIAGNOSIS — K21.00 GASTROESOPHAGEAL REFLUX DISEASE WITH ESOPHAGITIS: ICD-10-CM

## 2018-09-19 DIAGNOSIS — E55.9 VITAMIN D DEFICIENCY: ICD-10-CM

## 2018-09-19 DIAGNOSIS — N93.9 ABNORMAL UTERINE BLEEDING: Primary | ICD-10-CM

## 2018-09-19 DIAGNOSIS — Z11.3 SCREENING EXAMINATION FOR STD (SEXUALLY TRANSMITTED DISEASE): ICD-10-CM

## 2018-09-19 DIAGNOSIS — B00.9 HERPES SIMPLEX: ICD-10-CM

## 2018-09-19 DIAGNOSIS — K21.00 GASTROESOPHAGEAL REFLUX DISEASE WITH ESOPHAGITIS: Primary | ICD-10-CM

## 2018-09-19 DIAGNOSIS — N84.0 ENDOMETRIAL POLYP: ICD-10-CM

## 2018-09-19 DIAGNOSIS — N93.9 ABNORMAL UTERINE BLEEDING (AUB): Primary | ICD-10-CM

## 2018-09-19 PROBLEM — K21.9 GASTROESOPHAGEAL REFLUX DISEASE: Status: ACTIVE | Noted: 2018-01-29

## 2018-09-19 PROBLEM — N83.202 CYST OF LEFT OVARY: Status: RESOLVED | Noted: 2018-08-07 | Resolved: 2018-09-19

## 2018-09-19 LAB
25(OH)D3 SERPL-MCNC: 18.2 NG/ML (ref 30–100)
ANION GAP SERPL CALCULATED.3IONS-SCNC: 5 MMOL/L (ref 5–14)
BASOPHILS # BLD AUTO: 0 THOUSANDS/ΜL (ref 0–0.1)
BASOPHILS NFR BLD AUTO: 1 % (ref 0–1)
BUN SERPL-MCNC: 5 MG/DL (ref 5–25)
CALCIUM SERPL-MCNC: 8.8 MG/DL (ref 8.4–10.2)
CHLORIDE SERPL-SCNC: 106 MMOL/L (ref 97–108)
CO2 SERPL-SCNC: 28 MMOL/L (ref 22–30)
CREAT SERPL-MCNC: 0.75 MG/DL (ref 0.6–1.2)
EOSINOPHIL # BLD AUTO: 0 THOUSAND/ΜL (ref 0–0.4)
EOSINOPHIL NFR BLD AUTO: 1 % (ref 0–6)
ERYTHROCYTE [DISTWIDTH] IN BLOOD BY AUTOMATED COUNT: 12.9 %
GFR SERPL CREATININE-BSD FRML MDRD: 94 ML/MIN/1.73SQ M
GLUCOSE SERPL-MCNC: 81 MG/DL (ref 70–99)
HCT VFR BLD AUTO: 37.2 % (ref 36–46)
HGB BLD-MCNC: 12.5 G/DL (ref 12–16)
LYMPHOCYTES # BLD AUTO: 1.9 THOUSANDS/ΜL (ref 0.5–4)
LYMPHOCYTES NFR BLD AUTO: 36 % (ref 20–50)
MCH RBC QN AUTO: 30.8 PG (ref 26–34)
MCHC RBC AUTO-ENTMCNC: 33.6 G/DL (ref 31–36)
MCV RBC AUTO: 92 FL (ref 80–100)
MONOCYTES # BLD AUTO: 0.4 THOUSAND/ΜL (ref 0.2–0.9)
MONOCYTES NFR BLD AUTO: 7 % (ref 1–10)
NEUTROPHILS # BLD AUTO: 2.8 THOUSANDS/ΜL (ref 1.8–7.8)
NEUTS SEG NFR BLD AUTO: 55 % (ref 45–65)
PLATELET # BLD AUTO: 231 THOUSANDS/UL (ref 150–450)
PMV BLD AUTO: 9.3 FL (ref 8.9–12.7)
POTASSIUM SERPL-SCNC: 4 MMOL/L (ref 3.6–5)
RBC # BLD AUTO: 4.05 MILLION/UL (ref 4–5.2)
SL AMB POCT URINE HCG: NORMAL
SODIUM SERPL-SCNC: 139 MMOL/L (ref 137–147)
WBC # BLD AUTO: 5.1 THOUSAND/UL (ref 4.5–11)

## 2018-09-19 PROCEDURE — 86592 SYPHILIS TEST NON-TREP QUAL: CPT

## 2018-09-19 PROCEDURE — 87340 HEPATITIS B SURFACE AG IA: CPT

## 2018-09-19 PROCEDURE — 86803 HEPATITIS C AB TEST: CPT

## 2018-09-19 PROCEDURE — 36415 COLL VENOUS BLD VENIPUNCTURE: CPT

## 2018-09-19 PROCEDURE — 99214 OFFICE O/P EST MOD 30 MIN: CPT | Performed by: OBSTETRICS & GYNECOLOGY

## 2018-09-19 PROCEDURE — 3008F BODY MASS INDEX DOCD: CPT | Performed by: FAMILY MEDICINE

## 2018-09-19 PROCEDURE — 58340 CATHETER FOR HYSTEROGRAPHY: CPT | Performed by: OBSTETRICS & GYNECOLOGY

## 2018-09-19 PROCEDURE — 76830 TRANSVAGINAL US NON-OB: CPT

## 2018-09-19 PROCEDURE — 82306 VITAMIN D 25 HYDROXY: CPT

## 2018-09-19 PROCEDURE — 80048 BASIC METABOLIC PNL TOTAL CA: CPT

## 2018-09-19 PROCEDURE — 81025 URINE PREGNANCY TEST: CPT

## 2018-09-19 PROCEDURE — 99214 OFFICE O/P EST MOD 30 MIN: CPT | Performed by: FAMILY MEDICINE

## 2018-09-19 PROCEDURE — 85025 COMPLETE CBC W/AUTO DIFF WBC: CPT

## 2018-09-19 PROCEDURE — 76831 ECHO EXAM UTERUS: CPT

## 2018-09-19 PROCEDURE — 87389 HIV-1 AG W/HIV-1&-2 AB AG IA: CPT

## 2018-09-19 RX ORDER — VALACYCLOVIR HYDROCHLORIDE 1 G/1
1000 TABLET, FILM COATED ORAL DAILY
Qty: 30 TABLET | Refills: 5 | Status: SHIPPED | OUTPATIENT
Start: 2018-09-19 | End: 2018-10-02 | Stop reason: SDUPTHER

## 2018-09-19 RX ORDER — FAMOTIDINE 40 MG/1
40 TABLET, FILM COATED ORAL DAILY
Qty: 60 TABLET | Refills: 5 | Status: SHIPPED | OUTPATIENT
Start: 2018-09-19 | End: 2018-10-02 | Stop reason: SDUPTHER

## 2018-09-19 NOTE — PROGRESS NOTES
Assessment/Plan     Diagnoses and all orders for this visit:    Abnormal uterine bleeding    Endometrial polyp    Submucous leiomyoma of uterus    Screening examination for STD (sexually transmitted disease)  -     Hepatitis B surface antigen; Future  -     Hepatitis C antibody; Future  -     HIV 1/2 AG-AB combo; Future  -     RPR; Future          Johanna Carrillo served as our  today  discussed with patient findings from sonohysterography showing possible submucosal fibroid as well as endometrial polyp  Discussed with patient that most polyps are benign but could be can shows 1-5% of the time  Discussed with patient recommendation for hysteroscopy, D&C, polypectomy and possible myomectomy  This was recommended due to ongoing symptoms of abnormal bleeding as well as tha menopausal state with increased risk of endometrial hyperplasia or carcinoma  Discussed with patient risks associated with surgery including uterine perforation  She will be scheduled for the surgery accordingly  Follow up 2 weeks postoperatively  Donato Askew is an 50 y o  woman who presents for discussion of results  She has no new complaints today  Patient was seen previously in the beginning of July for annual exam   At that point she had complained of abnormal and irregular periods  Her pelvic ultrasound was normal showing an endometrial lining of 7 mm  Endometrial biopsy was performed and shows normal proliferative endometrium with features possibly suggesting of a small endometrial polyp  Patient is currently not having abnormal bleeding  Sonohysterogram demonstrates filling defects within the endometrium which may possibly include a small 9mm submucosal fibroid as well as small   Polyps  Patient also requesting testing for laboratory testing for history of fingerstick needle injury at work possible exposure to a person with HIV    OB History      Para Term  AB Living    3 1 1 0 2 1    SAB TAB Ectopic Multiple Live Births    0 1 0 0 1          The following portions of the patient's history were reviewed and updated as appropriate: allergies, current medications, past family history, past medical history, past social history, past surgical history and problem list       Review of Systems   Constitutional: Negative  HENT: Negative  Eyes: Negative  Respiratory: Negative  Cardiovascular: Negative  Gastrointestinal: Negative  Endocrine: Negative  Genitourinary:        As noted in HPI   Musculoskeletal: Negative  Skin: Negative  Allergic/Immunologic: Negative  Neurological: Negative  Hematological: Negative  Psychiatric/Behavioral: Negative  Objective    There were no vitals filed for this visit  Physical Exam   Constitutional: She is oriented to person, place, and time  She appears well-developed and well-nourished  No distress  Genitourinary: Uterus normal  Pelvic exam was performed with patient supine  There is no rash, tenderness, lesion or injury on the right labia  There is no rash, tenderness, lesion or injury on the left labia  No erythema, tenderness or bleeding in the vagina  No signs of injury around the vagina  No vaginal discharge found  Right adnexum does not display mass, does not display tenderness and does not display fullness  Left adnexum does not display mass, does not display tenderness and does not display fullness  Cervix does not exhibit motion tenderness, lesion, discharge or polyp  Uterus is not enlarged or tender  Neck: No thyromegaly present  Cardiovascular: Normal rate, regular rhythm and normal heart sounds  Pulmonary/Chest: Effort normal and breath sounds normal    Abdominal: Soft  She exhibits no distension  There is no tenderness  Musculoskeletal: She exhibits no edema or tenderness  Neurological: She is alert and oriented to person, place, and time  Skin: Skin is warm and dry     Psychiatric: She has a normal mood and affect  Her behavior is normal              Counseling / Coordination of Care  Total time spent today25  minutes  Greater than 50% of total time was spent with the patient and / or family counseling and / or coordination of care

## 2018-09-19 NOTE — PATIENT INSTRUCTIONS
Histeroscopía   LO QUE USTED DEBE SABER:   La histeroscopía es un procedimiento para mila por dentro de almaguer útero  Puede que se realicen otros procedimientos ken la histeroscopía  ACUERDOS SOBRE ALMAGUER CUIDADO:   Usted tiene el derecho de participar en la planificación de almaguer cuidado  Aprenda todo lo que pueda sobre almaguer condición y jazmyn darle tratamiento  Discuta con agnie médicos angie opciones de tratamiento para juntos decidir el cuidado que usted quiere recibir  Usted siempre tiene el derecho a rechazar almaguer tratamiento  RIESGOS:   Puede que usted tenga calambres abdominales  Puede que [employer] de lo esperado o desarrolle daniel infección  El procedimiento puede llegar a cicatrizar almaguer útero y causarle problemas más adelante  Usted también puede tener daniel reacción a los medicamentos usados para llenarle el útero ken la histeroscopía  Almaguer útero, intestino grueso o vejiga pueden llegar a sufrir daños ken el procedimiento  Jie, sin jewell procedimiento, puede que los médicos no encuentren la causa de angie problemas de [place]  PREPARÁNDOSE:   La semana antes de almaguer procedimiento:   · Anote la fecha correcta, el tiempo, y el lugar de almaguer procedimiento  · Arregle almaguer viaje de vuelta a casa  Pida a un miembro de shalom o a un amigo que lo lleva a almaguer casa después de almaguer cirugía o procedimiento  No maneje por almaguer cuenta  · Pregúntele a almaguer médico si usted tiene que dejar de usar la aspirina o algun otro medicamento prescribida o sin receta médica antes de almaguer procedimiento o cirugía  · Cuando vaya a consulta con [address], lleve [address] o los envases de angie medicamentos  Infórmele al médico si usted es alérgico a cualquier medicamento  Infórmele al médico si usted [place] productos herbales, suplementos nutricionales, o medicamentos de venta ann (sin receta médica)  · Puede que usted necesite exámenes de jovani antes de almaguer procedimiento   Hable con almaguer médico sobre estos y [de-identified] exámenes que usted pueda necesitar  Anote la fecha, la hora y el lugar para cada examen  La noche antes de almaguer procedimiento:   · Le pueden rich a usted un medicamento para ayudarle a dormir  · Pregúntale a roxann médicos sobre direcciones para comer y beber  El día de almaguer procedimiento:   · Pregúntale a almaguer médico antes de thomas cualquier medicamento ken el día de almaguer procedimiento  Estos medicamentos incluyen insulina, píldoras diabéticas, píldoras de hipertensión, o píldoras de corazón  Traiga daniel lista de todas los medicamentos que usted vera, o roxann botellas de [place], con usted al hospital     · A usted o un miembro de almaguer shalom cercano se les pedirán firmar un pedazo de documento legal conocido jazmyn un formulario de autorización  [place] da almaguer permiso a los médicos para hacer el procedimiento o [place]  También Time Gomez que puedrían ocurrir, y roxann opciones  Esté seguro que todos roxann preguntas hayan sido contestadas antes de que usted firme esta forma  · Los médicos pueden insertar un tubo intravenoso en almaguer vena  Por lo general, daniel vena en el brazo es elegida  Por el tubo Brown, pueden darle líquidos y [place]  · Un anestesiólogo hablará con usted antes de almaguer cirugía  Es posible que necesite medicamento para mantenerlo dormido o para adormecer alguna área de almaguer cuerpo ken la cirugía  Infórmele a los médicos si usted o alguien en almaguer shalom ha tenido un problema con la anestesia anteriormente  TRATAMIENTO:   Lo que sucederá:  Roxann piernas serán colocadas en los estribos de la junior [place]  Luego se colocará un instrumento conocido jazmyn histeroscopio en almaguer vagina  Procederán a llenarle el útero con dióxido de carbono o un líquido para mantenerlo abierto, lo cual permite a los médicos mila el útero más de [place]  Puede que se usen instrumentos para remover pólipos, fibromas, un DIU o para revisar si hay otros problemas si fuera necesario   Además se puede thomas [place] del tejido para enviarla al laboratorio a realizarle pruebas  Después de roberson procedimiento:  A usted la llevarán a daniel habitación de recuperación para que descanse hasta que esté despierta por completo  Los [employer] la monitorearán muy de cerca por algún problema  Favor de  no  salirse de la cama hasta que roberson médico lo autorice  Cuando roberson médico josefa que usted [address], usted entonces podría irse a casa o si no la llevarán a roberson habitación en el [facility] [place] [facility] daniel toalla sanitaria entre las piernas  Un miembro del personal médico revisará la toalla poco tiempo después del procedimiento para mila si hay sangrado  Es probable que usted sienta ganas de [place], desvanecimiento, mareos o náuseas después de roberson histeroscopía  PÓNGASE EN CONTACTO CON UN MÉDICO SI:   · Usted no puede cumplir con la stefanie para roberson procedimiento  · Usted tiene fiebre  · Usted se resfría o le da gripe  · Usted tiene preguntas o inquietudes sobre roberson procedimiento  BUSQUE ATENCIÓN INMEDIATA SI:   · Angie síntomas empeoran  © 2014 [address] is for End User's use only and may not be sold, redistributed or otherwise used for commercial purposes  All illustrations and images included in CareNotes® are the copyrighted property of A D A M , Inc  or Jonas Perdue  Esta información es sólo para uso en educación  Roberson intención no es darle un consejo médico sobre enfermedades o tratamientos  Colsulte con roberson Alben Leonel farmacéutico antes de seguir cualquier régimen médico para saber si es seguro y efectivo para usted  Dilatación y legrado   LO QUE USTED DEBE SABER:   Dilatación y legrado (D y L) es un procedimiento para extraer tejido del revestimiento del uterino  ACUERDOS SOBRE ROBERSON CUIDADO:   Usted tiene el derecho de participar en la planificación de roberson cuidado  Aprenda todo lo que pueda sobre roberson condición y jazmyn darle tratamiento   Discuta con angie médicos angie opciones de tratamiento para juntos decidir el cuidado que usted quiere recibir  Usted siempre tiene el derecho a rechazar almaguer tratamiento  RIESGOS:   · Si usted está despierta ken almaguer D y L, podría tener dolor mientras se está extrayendo el tejido  Almaguer útero, cérvix, intestinos, o tejidos cercanos podrían sufrir desgarre o daño  Usted podría tener daniel pérdida de jovani de peligro mortal y necesitar daniel transfusión de [place]  Usted podría necesitar daniel extracción de [place]  Usted podría contraer daniel infección en almaguer útero que se podría propagar a almaguer jovani y convertirse en daniel condición que amenace la danelle  Usted podría tener daniel reacción al medicamento anestésico     · Después de almaguer D y L, usted podría tener náusea, vómitos, [employer], o dolor de [place]  Se podría formar tejido cicatricial en almaguer útero  Usted podría necesitar otro D y L para extraer más tejido de almaguer útero  Si el cáncer es lo que ha causado almaguer sangrado vaginal anormal, es posible que las pruebas de laboratorio no puedan detectar el cáncer en almaguer útero  Si almaguer D y L fue hecho para terminar un embarazo, usted tendrá un mayor riesgo para un aborto natural en el futuro  También tendrá [employer] riesgo de tener un parto antes de tiempo ken embarazos futuros  · Si usted no tiene un D y L, angie síntomas jazmyn el sangrado vaginal podría continuar  Al perder [employer] debido a un sangrado vaginal abundante puede ser de peligro mortal  Es posible que los médicos no puedan encontrar la causa de angie síntomas y usted no tendrá el tratamiento adecuado  El tejido que haya permanecido en almaguer útero podría causar daniel infección que amenace la danelle  Consulte con almaguer médico si tiene preguntas o inquietudes acerca de almaguer condición o tratamiento  PREPARÁNDOSE:   Antes de almaguer procedimiento:   · Pregúntele a almaguer médico si usted tiene que dejar de usar la aspirina o algun otro medicamento prescribida o sin receta médica antes de almaguer procedimiento o cirugía       · Salvador Speaker a consulta con el médico, claudiae Shanita Bell lista o los envases de angie medicamentos  Infórmele al médico si usted es alérgico a cualquier medicamento  Infórmele al médico si usted [place] productos herbales, suplementos nutricionales, o medicamentos de venta ann (sin receta médica)  · Usted podría necesitar análisis de jovani antes y después de almaguer D y L  También podría necesitar medicamento de inmune globulina si almaguer jovani es Rh negativo y ha sufrido un aborto natural  Alicia medicamento ayuda a prevenir problemas con embarazos futuros  Consulte con almaguer médico para más información acerca del Rh negativo y el medicamento inmune globulina  · Usted podría necesitar daniel prueba vaginal con un aplicador de algodón antes de almaguer D y L para buscar infección  Es posible que los médicos apliquen medicamento en almaguer cérvix para ayudar a dilatarla  Alicia medicamento también se puede aplicar 1 a 2 días antes de almaguer D y L  También se podría aplicar el día del procedimiento  Consulte con almaguer médico para más información acerca de estas y [place] o tratamientos que usted podría necesitar  La noche antes de almaguer procedimiento:   · Pregúntale a angie médicos sobre direcciones para comer y beber  El día de almaguer procedimiento:   · Anote la fecha correcta, el [place], y el lugar de almaguer procedimiento  · A usted o un miembro de almaguer shalom cercano se les pedirán firmar un pedazo de documento legal conocido jazmyn un formulario de autorización  [place] da almaguer permiso a los médicos para hacer el procedimiento o [place]  También Time Gomez que puedrían ocurrir, y angie opciones  Esté seguro que todos angie preguntas hayan sido contestadas antes de que usted firme esta forma  · Los médicos pueden insertar un tubo intravenoso en almaguer vena  Por lo general, daniel vena en el brazo es elegida  Por el tubo Brown, pueden darle líquidos y [place]  · Un anestesiólogo hablará con usted antes de almaguer cirugía   Es posible que necesite medicamento para mantenerlo dormido o para adormecer Laura Belch de almaguer cuerpo ken la [place]  Infórmele a los médicos si usted o alguien en almaguer shalom ha tenido un problema con la anestesia anteriormente  · Se podrían administrar antibióticos para ayudar a tratar o prevenir daniel infección causada por gérmenes conocidos jazmyn bacteria  · Usted podría necesitar un ultrasonido del útero antes de almaguer procedimiento  Ken un ultrasonido, ondas sonoras muestran imágenes de almaguer útero en daniel pantalla tipo televisor  Esta prueba podría ayudar a almaguer médico a mila el tejido en almaguer útero que necesita ser extraído  TRATAMIENTO:   Lo que sucederá: · Usted podría recibir medicamento por almaguer vía intravenosa para ayudarla a relajar y causar sueño  Le podrían administrar anestesia local o epidural para adormecer el área del procedimiento y reducir la incomodidad  Con anestesia local o epidural, usted estará despierta ken almaguer procedimiento  Usted podría recibir anestesia general para mantenerla dormida y [place] of [place] de dolor ken almaguer procedimiento  Los médicos colaborarán con usted para decidir el tipo de anestesia adecuado para usted  Almaguer médico revisará almaguer cérvix para mila si está dilatado  De ser [place], almaguer médico podría usar instrumentos para dilatar almaguer cérvix  Se podría usar un ultrasonido para guiar a almaguer médico ken almaguer D y L     · Almaguer médico podría usar dainel cureta para extraer el tejido de almaguer cérvix y útero  Almaguer médico podría usar succión en vez de daniel cureta para extraer el tejido de almaguer útero  Para la succión, almaguer médico colocará daniel [place] de metal dentro de almaguer [place]  La sonda estará conectada a daniel máquina de succión o jeringuilla y el tejido es extraído a través de la sonda  Fórceps podrían ser necesarios para extraer cantidades de tejidos más grandes del [place]  El tejido que se extrae ken almaguer D y L podría ser enviado a un laboratorio para análisis   Ciertos medicamentos podrían ser administrados para ayudar a apretar almaguer útero y prevenir hemorragia después de almaguer D y [place] de michaels procedimiento:  Tavo [place] a daniel gay de recuperación  No  se levante de la cama sin que michaels médico lo autorice  Cuando los médicos determinen que usted no tiene ningún [place], puede regresar a michaels hogar  Si va a permanecer en el hospital, la trasladarán a michaels habitación nuevamente  [place] de espera: Medon es un área donde michaels shalom y amistades pueden esperar hasta que usted pueda tener visitas  Solicite que roxann visitas provean daniel forma de aviso si salen de la gay de espera  PÓNGASE EN CONTACTO CON UN MÉDICO SI:   · Usted tiene daniel fiebre  · Roxann síntomas jazmyn el sangrado vaginal empeoran  · Usted llegará tarde o es incapaz de cumplir con michaels stefanie para el D y L   BUSQUE ATENCIÓN INMEDIATA SI:   · Usted está orinando muy poco o no está orinando  · Usted se siente muy cansada, mareada o michaels piel está pálida  · Usted tiene un sangrado vaginal abundante que está empapando 2 toallas sanitarias en 1 hora por 2 horas seguidas  · Usted tiene fiebre y marj cólicos en michaels abdomen  © 2014 [address] is for End User's use only and may not be sold, redistributed or otherwise used for commercial purposes  All illustrations and images included in CareNotes® are the copyrighted property of A D A M , Inc  or Jonas Perdue  Esta información es sólo para uso en educación  Michaels intención no es darle un consejo médico sobre enfermedades o tratamientos  Colsulte con michaels Lina Prasad farmacéutico antes de seguir cualquier régimen médico para saber si es seguro y efectivo para usted

## 2018-09-19 NOTE — PROGRESS NOTES
AMB US Pelvic Non OB  Date/Time: 9/19/2018 7:46 AM  Performed by: Efrem Braun  Authorized by: Carmita Walters     Procedure details:     Indications: non-obstetric vaginal bleeding      Technique:  Transvaginal US, Non-OB    Position: lithotomy exam    Uterine findings:     Diameter (mm):  46    Length (mm):  84    Width (mm):  51    Endometrial stripe: identified      Endometrium thickness (mm):  5 3  Left ovary findings:     Left ovary:  Visualized    Diameter (mm):  15 4    Length (mm):  30 9    Width (mm):  18 3  Right ovary findings:     Right ovary:  Visualized    Diameter (mm):  27 9    Length (mm):  42 1    Width (mm):  23 8  Other findings:     Free pelvic fluid: identified    Post-Procedure Details:     Impression:  Anteverted uterus demonstrates multiple small fibroids  Largest are posterior subserosal lower uterine segment 1 2cm, posterior subserosal mid body 1 2cm, and  anterior intramural fundal 1 6cm  There is a 1 9cm nabothian cyst within the cervix  Bilateral ovaries appear within normal limits  The right ovary contains a 3 8ZQ follicle  There is free fluid within the cul de sac  Tolerance: Tolerated well, no immediate complications    Complications: no complications    Additional Procedure Comments:      [employer] F8 [facility] endovaginal transducer Serial [id number] was used to perform ultrasound followed by high level disinfectant ultilizing Cidex OPA protocol     Sonohysterogram  Date/Time: 9/19/2018 7:47 AM  Performed by: Carmita Walters  Authorized by: Carmita Walters     Consent:     Consent obtained:  Verbal and written    Consent given by:  Patient    Patient questions answered: yes      Patient agrees, verbalizes understanding, and wants to proceed: yes    Pre-procedure:     Pre-procedure timeout performed: yes      Prepped with: Betadine    Procedure:     Cervix cleaned and prepped: yes      Catheter inserted: yes      Uterine cavity distended with saline: yes    Post-procedure: Post procedure instructions given to patient: yes      Patient tolerated procedure well with no complications: yes    Comments:      Sonohysterogram demonstrates filling defects within the endometrium which may possibly include a small 9mm submucosal fibroid as well as small polyps  Patient was a bit light headed after the procedure  She left the department in good condition

## 2018-09-19 NOTE — PATIENT INSTRUCTIONS
Hipocalemia   LO QUE NECESITA SABER:   ¿Qué es la hipocalemia? La hipocalemia es cuando el nivel de potasio en la [facility] dyllan  El potasio ayuda a controlar el funcionamiento de los músculos, el corazón y del aparato digestivo  La hipocalemia ocurre cuando el cuerpo pierde demasiado potasio o no absorbe lo suficiente a partir de los alimentos  ¿Qué provoca la hipocalemia? · Diarrea o vómitos    · Medicamentos, jazmyn los diuréticos, medicamentos para la presión arterial o antibióticos    · Uso excesivo de laxantes    · Anorexia o bulimia nerviosa    · Afecciones médicas, jazmyn el síndrome de [place] o problemas renales    · No comer suficientes alimentos que contienen potasio  ¿Cuáles son los signos y síntomas de la hipocalemia? Es probable que usted no presente ningún signo o síntoma si tiene hipocalemia leve  Es posible que tenga alguno de los siguientes si es más grave:  · Cansancio    · Estreñimiento    · Orinar frecuentemente u orinar en grandes cantidades    · Calambres musculares u sensación de hormigueo en la piel    · Debilidad muscular    · Ritmo cardíaco acelerado o irregular  ¿Cómo se diagnostica la hipocalemia? · Un electrocardiograma  es daniel prueba que registra almaguer ritmo cardíaco y la rapidez de los latidos de almaguer corazón  Se utiliza para revisar si hay ritmo cardíaco irregular  · Los análisis de jovani:  se realizan para revisar el nivel de potasio  ¿Cómo se trata la hipocalemia? A usted le administrarán potasio para regresar los niveles a la normalidad  [place] podría administrarse en forma de píldora o de daniel vía intravenosa (IV)  La cantidad de potasio que se le dará depende de almaguer nivel de potasio  ¿Qué alimentos tienen un alto contenido de potasio? Algunos alimentos que tienen alto contenido de potasio son los [place], las [place], los [place], las patatas y el aguacate  Los frijoles pintos, el [place], el salmón, la carnzeeshan [place], [place] yogur y la [place] también son ricos en potasio   [place] más información a almaguer médico o dietista sobre los alimentos que son ricos en potasio  ¿Cuándo jermaine buscar atención inmediata? · Usted no puede  el brazo o la pierna  · Almaguer ritmo cardíaco es acelerado o irregular  · Se siente demasiado débil o mareado para ponerse de pie  ¿Cuándo jermaine comunicarme con mi médico?   · Tiene vómitos o diarrea  · Usted tiene entumecimiento u hormigueo en los brazos o las piernas  · Los síntomas no desaparecen o empeoran  · Usted tiene preguntas o inquietudes acerca de almaguer condición o cuidado  ACUERDOS SOBRE ALMAGUER CUIDADO:   Usted tiene el derecho de ayudar a planear almaguer cuidado  Aprenda todo lo que pueda sobre almaguer condición y jazmyn darle tratamiento  Discuta angie opciones de tratamiento con angie médicos para decidir el cuidado que usted desea recibir  Usted siempre tiene el derecho de rechazar el tratamiento  Esta información es sólo para uso en educación  Almaguer intención no es darle un consejo médico sobre enfermedades o tratamientos  Colsulte con almaguer Suzen Gackle farmacéutico antes de seguir cualquier régimen médico para saber si es seguro y efectivo para usted  © 2017 [address] [facility] Information is for End User's use only and may not be sold, redistributed or otherwise used for commercial purposes  All illustrations and images included in CareNotes® are the copyrighted property of A D A M , Inc  or Jonas Perdue

## 2018-09-19 NOTE — PROGRESS NOTES
Assessment/Plan:  1  Gastroesophageal reflux disease with esophagitis   I explained the patient the risk of continue using PPIs, and the need to change the therapy  Offering famotidine as a H2 inhibitor   - CBC and differential; Future  - famotidine (PEPCID) 40 MG tablet; Take 1 tablet (40 mg total) by mouth daily  Dispense: 60 tablet; Refill: 5    2  Hypokalemia  Rechecking potassium since its deficit can cause muscle spasms; patient is complaining of muscle aches and spasms  - Basic metabolic panel; Future    3  Vitamin D deficiency  Rechecking vitamin-D since patient had been a long-term vitamin-D supplementation  - Vitamin D 25 hydroxy; Future    4  Herpes simplex  Patient has frequent exacerbation of hyper is labialis  - valACYclovir (VALTREX) 1,000 mg tablet; Take 1 tablet (1,000 mg total) by mouth daily for 30 days  Dispense: 30 tablet; Refill: 5               Patient ID: Manus Kellie is a 50 y o  female  PT complaining of acid reflux at night  Abdominal Pain   This is a recurrent problem  The current episode started more than 1 month ago  The problem occurs constantly  The problem has been unchanged  The pain is located in the epigastric region  The pain is at a severity of 4/10  The pain is moderate  The quality of the pain is aching and burning  The abdominal pain radiates to the epigastric region  Associated symptoms include arthralgias and myalgias  Pertinent negatives include no fever, headaches, hematuria, nausea or vomiting  The pain is aggravated by certain positions  The pain is relieved by nothing  She has tried nothing for the symptoms  The following portions of the patient's history were reviewed and updated as appropriate: allergies, current medications, past family history, past medical history, past social history, past surgical history and problem list     Review of Systems   Constitutional: Negative for fatigue, fever and unexpected weight change     HENT: Negative for sore throat and trouble swallowing  Eyes: Negative for photophobia  Respiratory: Negative for cough, chest tightness, shortness of breath and wheezing  Cardiovascular: Negative for chest pain, palpitations and leg swelling  Gastrointestinal: Positive for abdominal pain  Negative for blood in stool, nausea and vomiting  Genitourinary: Negative for hematuria  Musculoskeletal: Positive for arthralgias, back pain, joint swelling and myalgias  Neurological: Negative for dizziness, syncope, light-headedness and headaches  Objective:      /70 (BP Location: Left arm, Patient Position: Standing, Cuff Size: Standard)   Pulse 89   Temp 98 °F (36 7 °C) (Oral)   Resp 16   Ht 5' (1 524 m)   Wt 61 7 kg (136 lb)   SpO2 98%   Breastfeeding? No   BMI 26 56 kg/m²          Physical Exam   Constitutional: She is oriented to person, place, and time  She appears well-developed and well-nourished  Cardiovascular: Normal rate, regular rhythm and normal heart sounds  Pulmonary/Chest: Effort normal and breath sounds normal    Abdominal: Soft  Bowel sounds are normal  There is tenderness  Musculoskeletal: Normal range of motion  Neurological: She is alert and oriented to person, place, and time  She has normal reflexes  Skin: Skin is warm and dry  Psychiatric: She has a normal mood and affect   Her behavior is normal  Judgment and thought content normal

## 2018-09-20 LAB
HBV SURFACE AG SER QL: NORMAL
HCV AB SER QL: NORMAL
RPR SER QL: NORMAL

## 2018-09-22 LAB — HIV 1+2 AB+HIV1 P24 AG SERPL QL IA: NORMAL

## 2018-10-01 DIAGNOSIS — R79.89 LOW VITAMIN D LEVEL: Primary | ICD-10-CM

## 2018-10-02 DIAGNOSIS — K21.00 GASTROESOPHAGEAL REFLUX DISEASE WITH ESOPHAGITIS: ICD-10-CM

## 2018-10-02 DIAGNOSIS — B00.9 HERPES SIMPLEX: ICD-10-CM

## 2018-10-02 RX ORDER — VALACYCLOVIR HYDROCHLORIDE 1 G/1
1000 TABLET, FILM COATED ORAL DAILY
Qty: 30 TABLET | Refills: 0 | Status: SHIPPED | OUTPATIENT
Start: 2018-10-02 | End: 2018-11-01 | Stop reason: SDUPTHER

## 2018-10-02 RX ORDER — ERGOCALCIFEROL 1.25 MG/1
CAPSULE ORAL
Qty: 13 CAPSULE | Refills: 0 | Status: SHIPPED | OUTPATIENT
Start: 2018-10-02 | End: 2019-07-23 | Stop reason: SDUPTHER

## 2018-10-02 RX ORDER — FAMOTIDINE 40 MG/1
40 TABLET, FILM COATED ORAL DAILY
Qty: 60 TABLET | Refills: 0 | Status: SHIPPED | OUTPATIENT
Start: 2018-10-02 | End: 2019-01-16 | Stop reason: ALTCHOICE

## 2018-10-04 ENCOUNTER — TELEPHONE (OUTPATIENT)
Dept: OBGYN CLINIC | Facility: CLINIC | Age: 48
End: 2018-10-04

## 2018-10-08 ENCOUNTER — TELEPHONE (OUTPATIENT)
Dept: OBGYN CLINIC | Facility: CLINIC | Age: 48
End: 2018-10-08

## 2018-10-08 NOTE — TELEPHONE ENCOUNTER
Called pt to schedule surgery for this Thursday 10/11/2018, left message on pt's voicemail to return my call

## 2018-10-09 ENCOUNTER — PREP FOR PROCEDURE (OUTPATIENT)
Dept: OBGYN CLINIC | Facility: CLINIC | Age: 48
End: 2018-10-09

## 2018-10-09 DIAGNOSIS — N84.0 ENDOMETRIAL POLYP: ICD-10-CM

## 2018-10-09 DIAGNOSIS — N93.9 ABNORMAL UTERINE BLEEDING (AUB): Primary | ICD-10-CM

## 2018-10-09 DIAGNOSIS — D25.0 SUBMUCOUS MYOMA OF UTERUS: ICD-10-CM

## 2018-10-09 RX ORDER — SODIUM CHLORIDE, SODIUM LACTATE, POTASSIUM CHLORIDE, CALCIUM CHLORIDE 600; 310; 30; 20 MG/100ML; MG/100ML; MG/100ML; MG/100ML
125 INJECTION, SOLUTION INTRAVENOUS CONTINUOUS
Status: CANCELLED | OUTPATIENT
Start: 2018-10-09

## 2018-10-09 NOTE — H&P
Assessment/Plan      Diagnoses and all orders for this visit:     Abnormal uterine bleeding     Endometrial polyp     Submucous leiomyoma of uterus             Nichole Chikicarine served as our  today  discussed with patient findings from sonohysterography showing possible submucosal fibroid as well as endometrial polyp  Discussed with patient that most polyps are benign but could be can shows 1-5% of the time  Discussed with patient recommendation for hysteroscopy, D&C, polypectomy and possible myomectomy  This was recommended due to ongoing symptoms of abnormal bleeding as well as tha menopausal state with increased risk of endometrial hyperplasia or carcinoma  Discussed with patient risks associated with surgery including uterine perforation  She will be scheduled for the surgery accordingly  Follow up 2 weeks postoperatively      Donato Gonzalez is an 50 y o  woman who presents for discussion of results  She has no new complaints today  Patient was seen previously in the beginning of July for annual exam  Carol Ceron that point she had complained of abnormal and irregular periods   Her pelvic ultrasound was normal showing an endometrial lining of 7 mm   Endometrial biopsy was performed and shows normal proliferative endometrium with features possibly suggesting of a small endometrial polyp   Patient is currently not having abnormal bleeding       Sonohysterogram demonstrates filling defects within the endometrium which may possibly include a small 9mm submucosal fibroid as well as small   Polyps         Patient also requesting testing for laboratory testing for history of fingerstick needle injury at work possible exposure to a person with HIV             OB History       Para Term  AB Living     3 1 1 0 2 1     SAB TAB Ectopic Multiple Live Births     0 1 0 0 1             The following portions of the patient's history were reviewed and updated as appropriate: R/O SBI allergies, current medications, past family history, past medical history, past social history, past surgical history and problem list         Review of Systems   Constitutional: Negative  HENT: Negative  Eyes: Negative  Respiratory: Negative  Cardiovascular: Negative  Gastrointestinal: Negative  Endocrine: Negative  Genitourinary:        As noted in HPI   Musculoskeletal: Negative  Skin: Negative  Allergic/Immunologic: Negative  Neurological: Negative  Hematological: Negative  Psychiatric/Behavioral: Negative           Objective     There were no vitals filed for this visit      Physical Exam   Constitutional: She is oriented to person, place, and time  She appears well-developed and well-nourished  No distress  Genitourinary: Uterus normal  Pelvic exam was performed with patient supine  There is no rash, tenderness, lesion or injury on the right labia  There is no rash, tenderness, lesion or injury on the left labia  No erythema, tenderness or bleeding in the vagina  No signs of injury around the vagina  No vaginal discharge found  Right adnexum does not display mass, does not display tenderness and does not display fullness  Left adnexum does not display mass, does not display tenderness and does not display fullness  Cervix does not exhibit motion tenderness, lesion, discharge or polyp  Uterus is not enlarged or tender  Neck: No thyromegaly present  Cardiovascular: Normal rate, regular rhythm and normal heart sounds  Pulmonary/Chest: Effort normal and breath sounds normal    Abdominal: Soft  She exhibits no distension  There is no tenderness  Musculoskeletal: She exhibits no edema or tenderness  Neurological: She is alert and oriented to person, place, and time  Skin: Skin is warm and dry  Psychiatric: She has a normal mood and affect   Her behavior is normal

## 2018-10-10 ENCOUNTER — ANESTHESIA EVENT (OUTPATIENT)
Dept: PERIOP | Facility: HOSPITAL | Age: 48
End: 2018-10-10
Payer: COMMERCIAL

## 2018-10-11 ENCOUNTER — ANESTHESIA (OUTPATIENT)
Dept: PERIOP | Facility: HOSPITAL | Age: 48
End: 2018-10-11
Payer: COMMERCIAL

## 2018-10-11 ENCOUNTER — HOSPITAL ENCOUNTER (OUTPATIENT)
Facility: HOSPITAL | Age: 48
Setting detail: OUTPATIENT SURGERY
Discharge: HOME | End: 2018-10-11
Attending: OBSTETRICS & GYNECOLOGY | Admitting: OBSTETRICS & GYNECOLOGY
Payer: COMMERCIAL

## 2018-10-11 VITALS
HEART RATE: 56 BPM | SYSTOLIC BLOOD PRESSURE: 98 MMHG | HEIGHT: 60 IN | OXYGEN SATURATION: 100 % | TEMPERATURE: 98.3 F | BODY MASS INDEX: 26.7 KG/M2 | WEIGHT: 136 LBS | RESPIRATION RATE: 15 BRPM | DIASTOLIC BLOOD PRESSURE: 58 MMHG

## 2018-10-11 DIAGNOSIS — Z98.890 S/P DILATATION AND CURETTAGE: Primary | ICD-10-CM

## 2018-10-11 DIAGNOSIS — D25.0 SUBMUCOUS MYOMA OF UTERUS: ICD-10-CM

## 2018-10-11 DIAGNOSIS — N93.9 ABNORMAL UTERINE BLEEDING (AUB): ICD-10-CM

## 2018-10-11 DIAGNOSIS — N84.0 ENDOMETRIAL POLYP: ICD-10-CM

## 2018-10-11 LAB
ERYTHROCYTE [DISTWIDTH] IN BLOOD BY AUTOMATED COUNT: 12.1 % (ref 11.6–15.1)
EXT PREGNANCY TEST URINE: NEGATIVE
HCT VFR BLD AUTO: 39.4 % (ref 34.8–46.1)
HGB BLD-MCNC: 12.9 G/DL (ref 11.5–15.4)
MCH RBC QN AUTO: 29.8 PG (ref 26.8–34.3)
MCHC RBC AUTO-ENTMCNC: 32.7 G/DL (ref 31.4–37.4)
MCV RBC AUTO: 91 FL (ref 82–98)
PLATELET # BLD AUTO: 229 THOUSANDS/UL (ref 149–390)
PMV BLD AUTO: 10.3 FL (ref 8.9–12.7)
RBC # BLD AUTO: 4.33 MILLION/UL (ref 3.81–5.12)
WBC # BLD AUTO: 4.06 THOUSAND/UL (ref 4.31–10.16)

## 2018-10-11 PROCEDURE — 81025 URINE PREGNANCY TEST: CPT | Performed by: OBSTETRICS & GYNECOLOGY

## 2018-10-11 PROCEDURE — 88305 TISSUE EXAM BY PATHOLOGIST: CPT | Performed by: PATHOLOGY

## 2018-10-11 PROCEDURE — 85027 COMPLETE CBC AUTOMATED: CPT | Performed by: OBSTETRICS & GYNECOLOGY

## 2018-10-11 PROCEDURE — 58558 HYSTEROSCOPY BIOPSY: CPT | Performed by: OBSTETRICS & GYNECOLOGY

## 2018-10-11 RX ORDER — PROPOFOL 10 MG/ML
INJECTION, EMULSION INTRAVENOUS AS NEEDED
Status: DISCONTINUED | OUTPATIENT
Start: 2018-10-11 | End: 2018-10-11 | Stop reason: SURG

## 2018-10-11 RX ORDER — OXYCODONE HYDROCHLORIDE AND ACETAMINOPHEN 5; 325 MG/1; MG/1
1 TABLET ORAL EVERY 4 HOURS PRN
Status: DISCONTINUED | OUTPATIENT
Start: 2018-10-11 | End: 2018-10-11 | Stop reason: HOSPADM

## 2018-10-11 RX ORDER — SODIUM CHLORIDE, SODIUM LACTATE, POTASSIUM CHLORIDE, CALCIUM CHLORIDE 600; 310; 30; 20 MG/100ML; MG/100ML; MG/100ML; MG/100ML
125 INJECTION, SOLUTION INTRAVENOUS CONTINUOUS
Status: DISCONTINUED | OUTPATIENT
Start: 2018-10-11 | End: 2018-10-11 | Stop reason: HOSPADM

## 2018-10-11 RX ORDER — DEXAMETHASONE SODIUM PHOSPHATE 4 MG/ML
INJECTION, SOLUTION INTRA-ARTICULAR; INTRALESIONAL; INTRAMUSCULAR; INTRAVENOUS; SOFT TISSUE AS NEEDED
Status: DISCONTINUED | OUTPATIENT
Start: 2018-10-11 | End: 2018-10-11 | Stop reason: SURG

## 2018-10-11 RX ORDER — ONDANSETRON 2 MG/ML
4 INJECTION INTRAMUSCULAR; INTRAVENOUS EVERY 6 HOURS PRN
Status: DISCONTINUED | OUTPATIENT
Start: 2018-10-11 | End: 2018-10-11 | Stop reason: HOSPADM

## 2018-10-11 RX ORDER — MAGNESIUM HYDROXIDE 1200 MG/15ML
LIQUID ORAL AS NEEDED
Status: DISCONTINUED | OUTPATIENT
Start: 2018-10-11 | End: 2018-10-11 | Stop reason: HOSPADM

## 2018-10-11 RX ORDER — MIDAZOLAM HYDROCHLORIDE 1 MG/ML
INJECTION INTRAMUSCULAR; INTRAVENOUS AS NEEDED
Status: DISCONTINUED | OUTPATIENT
Start: 2018-10-11 | End: 2018-10-11 | Stop reason: SURG

## 2018-10-11 RX ORDER — FENTANYL CITRATE 50 UG/ML
INJECTION, SOLUTION INTRAMUSCULAR; INTRAVENOUS AS NEEDED
Status: DISCONTINUED | OUTPATIENT
Start: 2018-10-11 | End: 2018-10-11 | Stop reason: SURG

## 2018-10-11 RX ORDER — IBUPROFEN 800 MG/1
800 TABLET ORAL EVERY 8 HOURS PRN
Qty: 30 TABLET | Refills: 0 | Status: SHIPPED | OUTPATIENT
Start: 2018-10-11 | End: 2019-01-16 | Stop reason: ALTCHOICE

## 2018-10-11 RX ORDER — SODIUM CHLORIDE 9 MG/ML
125 INJECTION, SOLUTION INTRAVENOUS CONTINUOUS
Status: DISCONTINUED | OUTPATIENT
Start: 2018-10-11 | End: 2018-10-11 | Stop reason: HOSPADM

## 2018-10-11 RX ORDER — ONDANSETRON 2 MG/ML
INJECTION INTRAMUSCULAR; INTRAVENOUS AS NEEDED
Status: DISCONTINUED | OUTPATIENT
Start: 2018-10-11 | End: 2018-10-11 | Stop reason: SURG

## 2018-10-11 RX ORDER — KETOROLAC TROMETHAMINE 30 MG/ML
INJECTION, SOLUTION INTRAMUSCULAR; INTRAVENOUS AS NEEDED
Status: DISCONTINUED | OUTPATIENT
Start: 2018-10-11 | End: 2018-10-11 | Stop reason: SURG

## 2018-10-11 RX ORDER — FENTANYL CITRATE 50 UG/ML
50 INJECTION, SOLUTION INTRAMUSCULAR; INTRAVENOUS
Status: DISCONTINUED | OUTPATIENT
Start: 2018-10-11 | End: 2018-10-11 | Stop reason: HOSPADM

## 2018-10-11 RX ADMIN — FENTANYL CITRATE 25 MCG: 50 INJECTION, SOLUTION INTRAMUSCULAR; INTRAVENOUS at 10:21

## 2018-10-11 RX ADMIN — SODIUM CHLORIDE 125 ML/HR: 0.9 INJECTION, SOLUTION INTRAVENOUS at 09:21

## 2018-10-11 RX ADMIN — ONDANSETRON HYDROCHLORIDE 4 MG: 2 INJECTION, SOLUTION INTRAVENOUS at 09:59

## 2018-10-11 RX ADMIN — DEXAMETHASONE SODIUM PHOSPHATE 4 MG: 4 INJECTION, SOLUTION INTRAMUSCULAR; INTRAVENOUS at 10:23

## 2018-10-11 RX ADMIN — SODIUM CHLORIDE: 0.9 INJECTION, SOLUTION INTRAVENOUS at 10:30

## 2018-10-11 RX ADMIN — KETOROLAC TROMETHAMINE 30 MG: 30 INJECTION, SOLUTION INTRAMUSCULAR at 10:29

## 2018-10-11 RX ADMIN — LIDOCAINE HYDROCHLORIDE 60 MG: 20 INJECTION, SOLUTION INTRAVENOUS at 10:06

## 2018-10-11 RX ADMIN — MIDAZOLAM 2 MG: 1 INJECTION INTRAMUSCULAR; INTRAVENOUS at 09:59

## 2018-10-11 RX ADMIN — PROPOFOL 150 MG: 10 INJECTION, EMULSION INTRAVENOUS at 10:06

## 2018-10-11 NOTE — OP NOTE
OPERATIVE REPORT  PATIENT NAME: Jorje Watson    :  1970  MRN: [id number]  Pt Location: [facility] OR ROOM 02    SURGERY DATE: 10/11/2018    Surgeon(s) and Role:     * Edwin Vallejo MD - Primary     * Val Jimenez MD - Assisting    Preop Diagnosis:  Abnormal uterine bleeding (AUB) [N93 9]  Endometrial polyp [N84 0]  Submucous myoma of uterus [D25 0]    Post-Op Diagnosis Codes:     * Abnormal uterine bleeding (AUB) [N93 9]     * Endometrial polyp [N84 0]     * Submucous myoma of uterus [D25 0]    Procedure(s) (LRB):  D&C, HYSTEROSCOPY (N/A)    Specimen(s):  ID Type Source Tests Collected by Time Destination   1 : endometrial curretings Tissue Endometrium TISSUE EXAM Edwin Vallejo MD 10/11/2018 1027        Estimated Blood Loss:   Minimal    Fluid deficit: 100ml     Anesthesia Type:   General LMA    Operative Indications:  Abnormal uterine bleeding (AUB) [N93 9]  Endometrial polyp [N84 0]  Submucous myoma of uterus [D25 0]    Operative Findings:  Anteverted uterus  Uterus sounded to 7cm  Submucosal fibroid in posterior wall of uterus  No obvious polyps seen    Complications:   None    Procedure and Technique: All risks, benefits, and alternatives to the procedure were discussed with the patient and she had the opportunity to ask questions  Informed consent was obtained  Patient was taken to the operating room were a time out was performed to confirm correct patient and correct procedure  General LMA anesthesia (LMA) was administered and the patient was positioned on the OR table in the dorsal lithotomy position  All pressure points were padded and a cipriano hugger was placed to maintain control of core body temperature  A bimanual exam was performed and the uterus was noted to be anteverted, normal in size and consistency with no palpable adnexal masses or fullness  The patient was prepped and draped in the usual sterile fashion      A straight catheter was introduced into the bladder, which was drained of 200cc of clear yellow urine  A weighted speculum was inserted into the vagina and a Juan Manuel retractor was used to visualize the anterior lip of the cervix, which was then grasped with a single toothed tenaculum  The uterus was sounded to 7cm  The cervix was serially dilated to 16F using Hayes dilators for introduction of the hysteroscope  Hysteroscope was introduced under direct visualization using normal saline solution as the distention media  Hysteroscope was advanced to the uterine fundus and the entire uterine cavity was inspected in a systematic manner  There was noted to be one submucosal fibroid on the posterior wall of the uterus  No polyps were visualized  Hysteroscope was withdrawn and sharp curetting was performed, starting at the 12'oclock position and rotating a total of 360 degrees to cover all surfaces  Endometrial tissue was obtained and sent for pathology  The single toothed tenaculum was removed from the anterior lip of the cervix  Good hemostasis was confirmed at the tenaculum puncture sites  Weighted speculum was then removed from the vagina  At the conclusion of the procedure, all needle, sponge, and instrument counts were noted to be correct x2  Patient tolerated the procedure well and was transferred to PACU in stable condition prior to discharge with follow up in 1-2 weeks  Dr Zee Henderson was present and participated in all key portions of the case      Patient Disposition:  PACU     SIGNATURE: Marce Pretty MD  DATE: October 11, 2018  TIME: 10:42 AM

## 2018-10-11 NOTE — ANESTHESIA PREPROCEDURE EVALUATION
Review of Systems/Medical History  Patient summary reviewed  Chart reviewed  No history of anesthetic complications     Cardiovascular  Negative cardio ROS    Pulmonary  Negative pulmonary ROS        GI/Hepatic    GERD well controlled,        Negative  ROS        Endo/Other  Negative endo/other ROS      GYN      Comment: Abn uterine bleeding      Hematology  Negative hematology ROS      Musculoskeletal  Negative musculoskeletal ROS        Neurology  Negative neurology ROS      Psychology   Negative psychology ROS              Physical Exam    Airway    Mallampati score: II  TM Distance: >3 FB  Neck ROM: full     Dental   implants,     Cardiovascular  Comment: Negative ROS, Rhythm: regular, Rate: normal, Cardiovascular exam normal    Pulmonary  Pulmonary exam normal Breath sounds clear to auscultation,     Other Findings        Anesthesia Plan  ASA Score- 2     Anesthesia Type- general with ASA Monitors  Additional Monitors:   Airway Plan: LMA  Plan Factors- Patient instructed to abstain from smoking on day of procedure  Patient did not smoke on day of surgery  Induction- intravenous  Postoperative Plan- Plan for postoperative opioid use  Planned trial extubation    Informed Consent- Anesthetic plan and risks discussed with patient

## 2018-10-11 NOTE — DISCHARGE INSTRUCTIONS
Dilatación y legrado   LO QUE USTED DEBE SABER:   Dilatación y legrado (D y L) es un procedimiento para extraer tejido del revestimiento del uterino  INSTRUCCIONES:   Medicamentos:   · Medicamento para el dolor: Usted podría recibir medicamento para suspender o reducir almaguer dolor  No espere hasta que almaguer dolor jewell muy severo para thomas almaguer medicamento  · Antibióticos:  Estos medicamentos ayudarán a combatir o prevenir daniel infección  · [place] angie medicamentos jazmyn se le haya indicado  Llame a almaguer proveedor de tiago si piensa que almaguer medicamento no le está ayudando o tiene efectos secundarios  Infórmele si es alérgico a algún medicamento  Mantenga daniel lista de angie medicamentos, vitaminas, y hierbas que está tomando  Incluya la cantidad que vera, la [place], y por qué las vera  Traiga la lista o las botellas de las píldoras a angie visitas de seguimiento  Lleve siempre consigo daniel lista de angie medicamentos en orlando de emergencia  Programe daniel stefanie con almaguer proveedor de [employer] se le haya indicado: Anote angie preguntas para que se acuerde de hacerlas ken angie visitas  Actividad:  Consulte con almaguer médico de cabecera cuándo usted puede regresar a angie actividades normales  Comuníquese con almaguer médico de cabecera si:   · Usted tiene flujo vaginal con mal olor  · Usted no tiene almaguer [employer]     · Usted se siente deprimida o ansiosa  · Usted se siente muy cansada y débil  · Usted tiene preguntas e inquietudes acerca de almaguer condición o cuidado  Regrese a la gay de emergencia si:   · Usted tiene sangrado vaginal abundante que empapa 1 toalla sanitaria en 1 hora por 2 horas seguidas  · Usted tiene fiebre y calambres abdominales  · Almaguer dolor no mejora, aun después del tratamiento  © 2014 [address] is for End User's use only and may not be sold, redistributed or otherwise used for commercial purposes   All illustrations and images included in CareNotes® are the copyrighted property of [employer] Madelin MELTON  Esta información es sólo para uso en educación  Almaguer intención no es darle un consejo médico sobre enfermedades o tratamientos  Colsulte con almaguer Valerie Antis farmacéutico antes de seguir cualquier régimen médico para saber si es seguro y efectivo para usted

## 2018-11-01 DIAGNOSIS — B00.9 HERPES SIMPLEX: ICD-10-CM

## 2018-11-02 RX ORDER — VALACYCLOVIR HYDROCHLORIDE 1 G/1
TABLET, FILM COATED ORAL
Qty: 30 TABLET | Refills: 5 | Status: SHIPPED | OUTPATIENT
Start: 2018-11-02 | End: 2018-11-29

## 2018-11-29 ENCOUNTER — OFFICE VISIT (OUTPATIENT)
Dept: FAMILY MEDICINE CLINIC | Facility: CLINIC | Age: 48
End: 2018-11-29
Payer: COMMERCIAL

## 2018-11-29 ENCOUNTER — OFFICE VISIT (OUTPATIENT)
Dept: OBGYN CLINIC | Facility: CLINIC | Age: 48
End: 2018-11-29
Payer: COMMERCIAL

## 2018-11-29 VITALS — WEIGHT: 137 LBS | DIASTOLIC BLOOD PRESSURE: 69 MMHG | SYSTOLIC BLOOD PRESSURE: 100 MMHG | BODY MASS INDEX: 26.76 KG/M2

## 2018-11-29 DIAGNOSIS — N93.9 ABNORMAL UTERINE BLEEDING (AUB): ICD-10-CM

## 2018-11-29 DIAGNOSIS — D25.0 SUBMUCOUS LEIOMYOMA OF UTERUS: Primary | ICD-10-CM

## 2018-11-29 DIAGNOSIS — A60.00 GENITAL HERPES SIMPLEX, UNSPECIFIED SITE: Primary | ICD-10-CM

## 2018-11-29 PROBLEM — N84.0 ENDOMETRIAL POLYP: Status: RESOLVED | Noted: 2018-09-19 | Resolved: 2018-11-29

## 2018-11-29 PROCEDURE — 99213 OFFICE O/P EST LOW 20 MIN: CPT | Performed by: FAMILY MEDICINE

## 2018-11-29 PROCEDURE — 99213 OFFICE O/P EST LOW 20 MIN: CPT | Performed by: OBSTETRICS & GYNECOLOGY

## 2018-11-29 RX ORDER — VALACYCLOVIR HCL 1000 MG
1000 TABLET ORAL DAILY
Qty: 30 TABLET | Refills: 5 | Status: SHIPPED | OUTPATIENT
Start: 2018-11-29 | End: 2019-07-23 | Stop reason: SDUPTHER

## 2018-11-29 NOTE — PROGRESS NOTES
Assessment/Plan:     Diagnoses and all orders for this visit:    Submucous leiomyoma of uterus    Abnormal uterine bleeding (AUB)        Patient with small amount of bleeding on examination  Her [address] in July was not menopausal   Tissue pathology is benign  Discussed with patient options to treat abnormal uterine bleeding associated with either anovulatory cycles or submucous leiomyoma which include expectant management, hormonal therapy with progestin, endometrial ablation or hysterectomy  Patient declines hormonal therapy due to previous nausea and vomiting with birth control pills  Patient would like to have expectant management for now and monitor her next few cycles  She will call if with continued abnormal uterine bleeding or metrorrhagia   Follow up in July for annual gyn exam   All questions were answered  Subjective   Patient ID: Phi Bassett is a 46 y o  female  Patient is here for a follow-up  Patient with history of hysteroscopy and D&C on 2018  Patient with history of abnormal uterine bleeding  Patient with sonohysterography findings that were consistent with either a polyp or submucosal fibroid  Findings on hysteroscopy D&C showed a small submucosal fibroid with no polyps  Tissue examination was benign with no hyperplasia or carcinoma  After D&C she had some spotting  Today she complains of having had 2 periods the past month with some cramping        OB History      Para Term  AB Living    3 1 1 0 2 1    SAB TAB Ectopic Multiple Live Births    0 1 0 0 1          Past Medical History:   Diagnosis Date    Abnormal mammogram of right breast 2018    f/u 6 mos    Herpes     Submucous leiomyoma of uterus 2018       Past Surgical History:   Procedure Laterality Date    BREAST SURGERY Bilateral     breast lift    COLONOSCOPY W/ POLYPECTOMY  2018    repeat 5 yrs    DILATION AND CURETTAGE OF UTERUS      [facility] HYSTEROSCOPY,W/ENDO BX N/A 10/11/2018    Procedure: D&C, HYSTEROSCOPY;  Surgeon: Mainor Manriquez MD;  Location: [facility];  Service: Gynecology       Allergies   Allergen Reactions    No Known Allergies          Current Outpatient Prescriptions:     diclofenac sodium (VOLTAREN) 1 %, apply (2G)  by topical route 3 times every day to the left knee, Disp: , Rfl:     Ergocalciferol (VITAMIN D2 PO), take 1 capsule by oral route  every week, Disp: , Rfl:     ergocalciferol (VITAMIN D2) 50,000 units, TAKE 1 CAPSULE BY MOUTH EVERY WEEK, Disp: 13 capsule, Rfl: 0    famotidine (PEPCID) 40 MG tablet, Take 1 tablet (40 mg total) by mouth daily, Disp: 60 tablet, Rfl: 0    ibuprofen (MOTRIN) 800 mg tablet, Take 1 tablet (800 mg total) by mouth every 8 (eight) hours as needed for mild pain, Disp: 30 tablet, Rfl: 0    valACYclovir (VALTREX) 1,000 mg tablet, TAKE 1 TABLET BY MOUTH EVERY DAY, Disp: 30 tablet, Rfl: 5      Review of Systems   Constitutional: Negative  HENT: Negative  Eyes: Negative  Respiratory: Negative  Cardiovascular: Negative  Gastrointestinal: Negative  Endocrine: Negative  Genitourinary:        As noted in HPI   Musculoskeletal: Negative  Skin: Negative  Allergic/Immunologic: Negative  Neurological: Negative  Hematological: Negative  Psychiatric/Behavioral: Negative  Physical Exam   Constitutional: She is oriented to person, place, and time  She appears well-developed and well-nourished  No distress  Genitourinary: Uterus normal  Pelvic exam was performed with patient supine  There is no rash, tenderness, lesion or injury on the right labia  There is no rash, tenderness, lesion or injury on the left labia  There is bleeding in the vagina  No erythema or tenderness in the vagina  No signs of injury around the vagina  No vaginal discharge found  Right adnexum does not display mass, does not display tenderness and does not display fullness   Left adnexum does not display mass, does not display tenderness and does not display fullness  Cervix does not exhibit motion tenderness, lesion, discharge or polyp  Uterus is not enlarged or tender  Abdominal: Soft  She exhibits no distension  There is no tenderness  Neurological: She is alert and oriented to person, place, and time  Skin: Skin is warm and dry  Psychiatric: She has a normal mood and affect  Her behavior is normal              Menstrual History:  OB History      Para Term  AB Living    3 1 1 0 2 1    SAB TAB Ectopic Multiple Live Births    0 1 0 0 1           No LMP recorded  The following portions of the patient's history were reviewed and updated as appropriate: allergies, current medications, past family history, past medical history, past social history, past surgical history and problem list       Counseling / Coordination of Care  Total time spent today30 minutes  minutes  Greater than 50% of total time was spent with the patient and / or family counseling and / or coordination of care

## 2018-12-03 NOTE — PROGRESS NOTES
Assessment/Plan:        1  Genital herpes simplex, unspecified site  Continue on maintaince as patient choice  Generic product do not reach the desire effect different from brand name  Requesting brand name Valtrex  - VALTREX 1 g tablet; Take 1 tablet (1,000 mg total) by mouth daily for 30 days  Dispense: 30 tablet; Refill: 5    No problem-specific Assessment & Plan notes found for this encounter  Diagnoses and all orders for this visit:    Genital herpes simplex, unspecified site  -     VALTREX 1 g tablet; Take 1 tablet (1,000 mg total) by mouth daily for 30 days    Herpes simplex          Subjective:      Patient ID: Dena Benito is a 46 y o  female  Patient is here for refill of medication for genitalia  Herpes, she finds generic of the not help her in the control of prevention and treatment of the genital herpes is no as it is with the brand name product  The following portions of the patient's history were reviewed and updated as appropriate: allergies, current medications, past family history, past medical history, past social history, past surgical history and problem list     Review of Systems   Constitutional: Negative for diaphoresis, fatigue, fever and unexpected weight change  Respiratory: Negative for cough, chest tightness, shortness of breath and wheezing  Cardiovascular: Negative for chest pain, palpitations and leg swelling  Gastrointestinal: Negative for abdominal pain, blood in stool, nausea and vomiting  Neurological: Negative for dizziness, syncope, light-headedness and headaches  Hematological: Does not bruise/bleed easily  Psychiatric/Behavioral: Negative for behavioral problems, self-injury and sleep disturbance  The patient is not nervous/anxious  Objective: There were no vitals taken for this visit  Physical Exam   Constitutional: She is oriented to person, place, and time  She appears well-developed and well-nourished     HENT: Right Ear: There is tenderness  Tympanic membrane is injected  A middle ear effusion is present  Left Ear: Tympanic membrane is injected  Nose: Mucosal edema, rhinorrhea and sinus tenderness present  Right sinus exhibits frontal sinus tenderness  Left sinus exhibits frontal sinus tenderness  Mouth/Throat: Mucous membranes are normal  No oral lesions  Oropharyngeal exudate, posterior oropharyngeal edema and posterior oropharyngeal erythema present  No tonsillar abscesses  Cardiovascular: Normal rate, regular rhythm and normal heart sounds  Abdominal: Soft  Bowel sounds are normal    Musculoskeletal: Normal range of motion  Neurological: She is alert and oriented to person, place, and time  She has normal reflexes  Skin: Skin is warm and dry  Psychiatric: She has a normal mood and affect   Her behavior is normal  Judgment and thought content normal

## 2018-12-29 ENCOUNTER — HOSPITAL ENCOUNTER (EMERGENCY)
Facility: HOSPITAL | Age: 48
Discharge: HOME | End: 2018-12-29
Attending: EMERGENCY MEDICINE | Admitting: EMERGENCY MEDICINE
Payer: COMMERCIAL

## 2018-12-29 VITALS
TEMPERATURE: 98.3 F | HEART RATE: 93 BPM | OXYGEN SATURATION: 100 % | RESPIRATION RATE: 16 BRPM | BODY MASS INDEX: 25.49 KG/M2 | HEIGHT: 61 IN | SYSTOLIC BLOOD PRESSURE: 128 MMHG | DIASTOLIC BLOOD PRESSURE: 75 MMHG | WEIGHT: 135 LBS

## 2018-12-29 DIAGNOSIS — S39.012A LUMBAR STRAIN, INITIAL ENCOUNTER: Primary | ICD-10-CM

## 2018-12-29 LAB
BACTERIA UR QL AUTO: ABNORMAL /HPF
BILIRUB UR QL STRIP: NEGATIVE
CLARITY UR: CLEAR
CLARITY, POC: CLEAR
COLOR UR: YELLOW
COLOR, POC: YELLOW
EXT PREG TEST URINE: NEGATIVE
GLUCOSE UR STRIP-MCNC: NEGATIVE MG/DL
HGB UR QL STRIP.AUTO: ABNORMAL
KETONES UR STRIP-MCNC: NEGATIVE MG/DL
LEUKOCYTE ESTERASE UR QL STRIP: NEGATIVE
NITRITE UR QL STRIP: NEGATIVE
NON-SQ EPI CELLS URNS QL MICRO: ABNORMAL /HPF
PH UR STRIP.AUTO: 8.5 [PH] (ref 4.5–8)
PROT UR STRIP-MCNC: NEGATIVE MG/DL
RBC #/AREA URNS AUTO: ABNORMAL /HPF
SP GR UR STRIP.AUTO: 1.02 (ref 1–1.03)
UROBILINOGEN UR QL STRIP.AUTO: 0.2 E.U./DL
WBC #/AREA URNS AUTO: ABNORMAL /HPF

## 2018-12-29 PROCEDURE — 81001 URINALYSIS AUTO W/SCOPE: CPT

## 2018-12-29 PROCEDURE — 99283 EMERGENCY DEPT VISIT LOW MDM: CPT

## 2018-12-29 PROCEDURE — 96372 THER/PROPH/DIAG INJ SC/IM: CPT

## 2018-12-29 PROCEDURE — 81025 URINE PREGNANCY TEST: CPT | Performed by: NURSE PRACTITIONER

## 2018-12-29 RX ORDER — METHOCARBAMOL 750 MG/1
750 TABLET, FILM COATED ORAL 3 TIMES DAILY
Qty: 21 TABLET | Refills: 0 | Status: SHIPPED | OUTPATIENT
Start: 2018-12-29 | End: 2019-01-16 | Stop reason: ALTCHOICE

## 2018-12-29 RX ORDER — METHOCARBAMOL 500 MG/1
750 TABLET, FILM COATED ORAL ONCE
Status: COMPLETED | OUTPATIENT
Start: 2018-12-29 | End: 2018-12-29

## 2018-12-29 RX ORDER — SENNOSIDES 8.6 MG
650 CAPSULE ORAL EVERY 8 HOURS PRN
Qty: 21 TABLET | Refills: 0 | Status: SHIPPED | OUTPATIENT
Start: 2018-12-29 | End: 2019-01-05

## 2018-12-29 RX ORDER — ACETAMINOPHEN 325 MG/1
650 TABLET ORAL ONCE
Status: COMPLETED | OUTPATIENT
Start: 2018-12-29 | End: 2018-12-29

## 2018-12-29 RX ORDER — KETOROLAC TROMETHAMINE 30 MG/ML
15 INJECTION, SOLUTION INTRAMUSCULAR; INTRAVENOUS ONCE
Status: COMPLETED | OUTPATIENT
Start: 2018-12-29 | End: 2018-12-29

## 2018-12-29 RX ADMIN — METHOCARBAMOL 750 MG: 500 TABLET, FILM COATED ORAL at 16:01

## 2018-12-29 RX ADMIN — KETOROLAC TROMETHAMINE 15 MG: 30 INJECTION, SOLUTION INTRAMUSCULAR at 16:00

## 2018-12-29 RX ADMIN — ACETAMINOPHEN 650 MG: 325 TABLET, FILM COATED ORAL at 16:01

## 2018-12-29 NOTE — DISCHARGE INSTRUCTIONS
Low Back Strain   WHAT YOU NEED TO KNOW:   Low back strain is an injury to your lower back muscles or tendons  Tendons are strong tissues that connect muscles to bones  The lower back supports most of your body weight and helps you move, twist, and bend  DISCHARGE INSTRUCTIONS:   Return to the emergency department if:   · You hear or feel a pop in your lower back  · You have increased swelling or pain in your lower back  · You have trouble moving your legs  · Your legs are numb  Contact your healthcare provider if:   · You have a fever  · Your pain does not go away, even after treatment  · You have questions or concerns about your condition or care  Medicines: The following medicines may be ordered by your healthcare provider:  · Acetaminophen decreases pain and fever  It is available without a doctor's order  Ask how much to take and how often to take it  Follow directions  Acetaminophen can cause liver damage if not taken correctly  · NSAIDs , such as ibuprofen, help decrease swelling, pain, and fever  This medicine is available with or without a doctor's order  NSAIDs can cause stomach bleeding or kidney problems in certain people  If you take blood thinner medicine, always ask your healthcare provider if NSAIDs are safe for you  Always read the medicine label and follow directions  · Muscle relaxers  help decrease pain and muscle spasms  · Prescription pain medicine  may be given  Ask how to take this medicine safely  · Take your medicine as directed  Contact your healthcare provider if you think your medicine is not helping or if you have side effects  Tell him or her if you are allergic to any medicine  Keep a list of the medicines, vitamins, and herbs you take  Include the amounts, and when and why you take them  Bring the list or the pill bottles to follow-up visits  Carry your medicine list with you in case of an emergency  Self-care:   · Rest  as directed   You may need to rest in bed for a period of time after your injury  Do not lift heavy objects  · Apply ice  on your back for 15 to 20 minutes every hour or as directed  Use an ice pack, or put crushed ice in a plastic bag  Cover it with a towel  Ice helps prevent tissue damage and decreases swelling and pain  · Apply heat  on your lower back for 20 to 30 minutes every 2 hours for as many days as directed  Heat helps decrease pain and muscle spasms  · Slowly start to increase your activity  as the pain decreases, or as directed  Prevent another low back strain:   · Use correct body movements  ¨ Bend at the hips and knees when you  objects  Do not bend from the waist  Use your leg muscles as you lift the load  Do not use your back  Keep the object close to your chest as you lift it  Try not to twist or lift anything above your waist     ¨ Change your position often when you stand for long periods of time  Rest one foot on a small box or footrest, and then switch to the other foot often  ¨ Try not to sit for long periods of time  When you do, sit in a straight-backed chair with your feet flat on the floor  ¨ Never reach, pull, or push while you are sitting  · Warm up before you exercise  Do exercises that strengthen your back muscles  Ask your healthcare provider about the best exercise plan for you  · Maintain a healthy weight  Ask your healthcare provider how much you should weigh  Ask him to help you create a weight loss plan if you are overweight  © 2017 [address] [employer] Information is for End User's use only and may not be sold, redistributed or otherwise used for commercial purposes  All illustrations and images included in CareNotes® are the copyrighted property of [employer]  or Jonas Perdue  The above information is an  only  It is not intended as medical advice for individual conditions or treatments   Talk to your doctor, nurse or pharmacist before following any medical regimen to see if it is safe and effective for you

## 2019-01-16 ENCOUNTER — OFFICE VISIT (OUTPATIENT)
Dept: FAMILY MEDICINE CLINIC | Facility: CLINIC | Age: 49
End: 2019-01-16
Payer: COMMERCIAL

## 2019-01-16 VITALS
TEMPERATURE: 98 F | BODY MASS INDEX: 26.7 KG/M2 | WEIGHT: 136 LBS | SYSTOLIC BLOOD PRESSURE: 110 MMHG | DIASTOLIC BLOOD PRESSURE: 70 MMHG | RESPIRATION RATE: 16 BRPM | HEIGHT: 60 IN | OXYGEN SATURATION: 98 % | HEART RATE: 78 BPM

## 2019-01-16 DIAGNOSIS — M53.3 SACROILIAC JOINT PAIN: Primary | ICD-10-CM

## 2019-01-16 PROCEDURE — 99213 OFFICE O/P EST LOW 20 MIN: CPT | Performed by: FAMILY MEDICINE

## 2019-01-16 PROCEDURE — 20610 DRAIN/INJ JOINT/BURSA W/O US: CPT | Performed by: FAMILY MEDICINE

## 2019-01-16 RX ADMIN — TRIAMCINOLONE ACETONIDE 40 MG: 40 INJECTION, SUSPENSION INTRA-ARTICULAR; INTRAMUSCULAR at 09:57

## 2019-01-16 NOTE — PATIENT INSTRUCTIONS
Inyección en la articulación sacroilíaca   LO QUE NECESITA SABER:   ¿Qué necesito saber sobre daniel inyección en la articulación sacroilíaca? Daniel inyección en la articulación sacroilíaca se aplica para diagnosticar o tratar el dolor del síndrome de la articulación sacroilíaca  El dolor causado por jewell síndrome puede sentirse en la parte baja de la espalda, glúteos, michel y Amelia  ¿Cómo me preparo para daniel inyección en la articulación sacroilíaca? Almaguer médico le pedirá que no tome ningún medicamento para el dolor el día de la inyección  Pregúntele si hay otros medicamentos que usted no deba thomas  Usted tendrá que pedirle a alguien que lo lleve a casa después de almaguer procedimiento  ¿Qué pasará ken la inyección en la articulación sacroilíaca? Usted estará despierto mientras le aplican la inyección  Es probable que [employer] un medicamento calmante si usted sufre de Santos guzman  · Usted se acostará boca abajo con daniel almohada debajo de almaguer abdomen  Almaguer médico le aplicará daniel inyección de medicamento para dormirle Maybelle Buys  Es probable que use gypsy X, ultrasonido o tomografía computarizada para encontrar el área de la articulación donde deberá aplicarle la inyección  Es probable que [employer] apliquen daniel inyección de un material de contraste para ayudar que almaguer articulación sacroilíaca se josefa mejor  Luego, almaguer médico le inyectará la anestesia local, medicamento antiinflamatorio o ambos, en almaguer articulación sacroilíaca  · Los médicos lo vigilarán a usted muy cuidadosamente en orlando de algún problema que pueda suceder incluso 30 minutos después de almaguer inyección  Almaguer médico revisará para mila si almaguer dolor disminuye después de la inyección  ¿Cuáles son los riesgos de daniel inyección en la articulación sacroilíaca? Es posible que usted tenga un poco de debilidad por un periodo corto de tiempo después de almaguer inyección  La inyección en la articulación sacroilíaca puede causar sangrado, infección y dolor   También puede causar debilidad temporal en roberson pierna y problemas para orinar  Puede que usted tenga daniel reacción alérgica al medicamento que le inyectaron en roberson articulación sacroilíaca  Puede que roberson dolor regrese y usted necesite más tratamiento  ACUERDOS SOBRE ROBERSON CUIDADO:   Usted tiene el derecho de ayudar a planear roberson cuidado  Aprenda todo lo que pueda sobre roberson condición y jazmyn darle tratamiento  Discuta angie opciones de tratamiento con angie médicos para decidir el cuidado que usted desea recibir  Usted siempre tiene el derecho de rechazar el tratamiento  Esta información es sólo para uso en educación  Roberson intención no es darle un consejo médico sobre enfermedades o tratamientos  Colsulte con roberson [employer] farmacéutico antes de seguir cualquier régimen médico para saber si es seguro y efectivo para usted  © 2017 [facility] Information is for End User's use only and may not be sold, redistributed or otherwise used for commercial purposes  All illustrations and images included in CareNotes® are the copyrighted property of [employer]  or Jonas Perdue

## 2019-01-16 NOTE — PROGRESS NOTES
Assessment/Plan:  1  Sacroiliac joint pain  The choice of NSAID's in this patient depends of her current pain syndrome  I explained to patient that  response of NSAIDs varies between patient's and also is different in regarding to the area of the body affected in the progression of the damage  The drug interactions and comorbidities affecting by these medications were explained to patient also; the caution in toxicity in the GI tract was also discussed  Prevent the long-term use of these medications may be wise  The use ice and physical therapy is necessary in order to get the best results  NSAID's might elevate BP or block effect of certain antihypertensive agents  Use with caution  Always use ice and therapy to decrease or avoid the need for a Pharmacological agent  Also injected joint   - Large joint arthrocentesis  - conjugated estrogens (PREMARIN) vaginal cream; Insert 0 5 g into the vagina daily  Dispense: 30 g; Refill: 5    No problem-specific Assessment & Plan notes found for this encounter  Diagnoses and all orders for this visit:    Sacroiliac joint pain    Other orders  -     XR sacroiliac joints 3+ views; Future          Subjective:      Patient ID: Elizabeth Leonardo is a 46 y o  female  Patient states pain and the left lower back since the 23rd of December  Pain worsening when she straightens her left leg  She went to the emergency room for the same issue and she was injected with anti-inflammatory on her arm  Pain continues at the level said 7/10  The following portions of the patient's history were reviewed and updated as appropriate: allergies, current medications, past family history, past medical history, past social history, past surgical history and problem list     Review of Systems   Constitutional: Negative for fatigue, fever and unexpected weight change  HENT: Negative for sore throat and trouble swallowing  Eyes: Negative for photophobia     Respiratory: Negative for cough, chest tightness, shortness of breath and wheezing  Cardiovascular: Negative for chest pain, palpitations and leg swelling  Gastrointestinal: Negative for abdominal pain, blood in stool, nausea and vomiting  Genitourinary: Negative for hematuria  Neurological: Negative for dizziness, syncope, light-headedness and headaches  Hematological: Does not bruise/bleed easily  Objective:      /70 (BP Location: Left arm, Patient Position: Sitting, Cuff Size: Standard)   Pulse 78   Temp 98 °F (36 7 °C) (Oral)   Resp 16   Ht 5' (1 524 m)   Wt 61 7 kg (136 lb)   LMP 12/23/2018   SpO2 98%   Breastfeeding? No   BMI 26 56 kg/m²            Physical Exam   Constitutional: She is oriented to person, place, and time  She is cooperative  HENT:   Head: Normocephalic  Head is without contusion, without right periorbital erythema and without left periorbital erythema  Right Ear: Hearing, tympanic membrane, external ear and ear canal normal    Left Ear: Hearing, tympanic membrane, external ear and ear canal normal    Nose: Nose normal    Mouth/Throat: Uvula is midline, oropharynx is clear and moist and mucous membranes are normal    Eyes: Pupils are equal, round, and reactive to light  EOM are normal    Neck: Muscular tenderness present  Decreased range of motion present  No edema and no erythema present  Cardiovascular: Normal rate, regular rhythm, normal heart sounds and normal pulses  Pulmonary/Chest: Effort normal and breath sounds normal  She exhibits tenderness  She exhibits no deformity and no swelling  Abdominal: Soft  Normal appearance and bowel sounds are normal  She exhibits no abdominal bruit and no pulsatile midline mass  There is no hepatosplenomegaly  There is generalized tenderness  No hernia  Musculoskeletal: She exhibits tenderness  Lumbar back: She exhibits decreased range of motion and tenderness (Over the left sacral iliac joint)     Neurological: She is alert and oriented to person, place, and time  She has normal strength and normal reflexes  No cranial nerve deficit or sensory deficit  Skin: Skin is warm, dry and intact  Psychiatric: She has a normal mood and affect  Her behavior is normal  Judgment and thought content normal  Cognition and memory are normal    Vitals reviewed      Large joint arthrocentesis  Date/Time: 1/16/2019 9:57 AM  Consent given by: patient  Supporting Documentation  Indications: pain   Procedure Details  Location: hip - L hip joint  Needle size: 25 G  Ultrasound guidance: no  Approach: posterior  Medications administered: 40 mg triamcinolone acetonide 40 mg/mL    Patient tolerance: patient tolerated the procedure well with no immediate complications  Dressing:  Sterile dressing applied

## 2019-01-20 RX ORDER — TRIAMCINOLONE ACETONIDE 40 MG/ML
40 INJECTION, SUSPENSION INTRA-ARTICULAR; INTRAMUSCULAR
Status: COMPLETED | OUTPATIENT
Start: 2019-01-16 | End: 2019-01-16

## 2019-03-29 ENCOUNTER — OFFICE VISIT (OUTPATIENT)
Dept: OBGYN CLINIC | Facility: CLINIC | Age: 49
End: 2019-03-29
Payer: COMMERCIAL

## 2019-03-29 ENCOUNTER — HOSPITAL ENCOUNTER (OUTPATIENT)
Dept: RADIOLOGY | Facility: HOSPITAL | Age: 49
Discharge: HOME | End: 2019-03-29
Payer: COMMERCIAL

## 2019-03-29 ENCOUNTER — OFFICE VISIT (OUTPATIENT)
Dept: FAMILY MEDICINE CLINIC | Facility: CLINIC | Age: 49
End: 2019-03-29
Payer: COMMERCIAL

## 2019-03-29 ENCOUNTER — APPOINTMENT (OUTPATIENT)
Dept: LAB | Facility: HOSPITAL | Age: 49
End: 2019-03-29
Attending: OBSTETRICS & GYNECOLOGY
Payer: COMMERCIAL

## 2019-03-29 VITALS
BODY MASS INDEX: 24.92 KG/M2 | WEIGHT: 132 LBS | HEIGHT: 61 IN | DIASTOLIC BLOOD PRESSURE: 76 MMHG | SYSTOLIC BLOOD PRESSURE: 112 MMHG

## 2019-03-29 VITALS
DIASTOLIC BLOOD PRESSURE: 78 MMHG | HEART RATE: 71 BPM | TEMPERATURE: 97.9 F | WEIGHT: 132.4 LBS | HEIGHT: 61 IN | OXYGEN SATURATION: 98 % | RESPIRATION RATE: 19 BRPM | BODY MASS INDEX: 25 KG/M2 | SYSTOLIC BLOOD PRESSURE: 119 MMHG

## 2019-03-29 DIAGNOSIS — E66.3 OVERWEIGHT (BMI 25.0-29.9): ICD-10-CM

## 2019-03-29 DIAGNOSIS — D25.0 SUBMUCOUS LEIOMYOMA OF UTERUS: ICD-10-CM

## 2019-03-29 DIAGNOSIS — M79.671 PAIN IN BOTH FEET: ICD-10-CM

## 2019-03-29 DIAGNOSIS — N93.9 ABNORMAL UTERINE BLEEDING (AUB): ICD-10-CM

## 2019-03-29 DIAGNOSIS — N93.9 ABNORMAL UTERINE BLEEDING (AUB): Primary | ICD-10-CM

## 2019-03-29 DIAGNOSIS — M25.561 ACUTE PAIN OF RIGHT KNEE: Primary | ICD-10-CM

## 2019-03-29 DIAGNOSIS — M53.3 SACROILIAC JOINT PAIN: ICD-10-CM

## 2019-03-29 DIAGNOSIS — M79.672 PAIN IN BOTH FEET: ICD-10-CM

## 2019-03-29 DIAGNOSIS — M25.561 ACUTE PAIN OF RIGHT KNEE: ICD-10-CM

## 2019-03-29 PROCEDURE — 72202 X-RAY EXAM SI JOINTS 3/> VWS: CPT

## 2019-03-29 PROCEDURE — 99214 OFFICE O/P EST MOD 30 MIN: CPT | Performed by: NURSE PRACTITIONER

## 2019-03-29 PROCEDURE — 99214 OFFICE O/P EST MOD 30 MIN: CPT | Performed by: OBSTETRICS & GYNECOLOGY

## 2019-03-29 PROCEDURE — 73562 X-RAY EXAM OF KNEE 3: CPT

## 2019-03-29 RX ORDER — NAPROXEN 500 MG/1
500 TABLET ORAL 2 TIMES DAILY WITH MEALS
Qty: 60 TABLET | Refills: 0 | Status: SHIPPED | OUTPATIENT
Start: 2019-03-29 | End: 2019-07-23 | Stop reason: ALTCHOICE

## 2019-03-29 NOTE — PROGRESS NOTES
Assessment/Plan:     Diagnoses and all orders for this visit:    Abnormal uterine bleeding (AUB)  -     US pelvis complete non OB; Future  -     CBC; Future  -     hCG, quantitative; Future  -     TSH, 3rd generation with Free T4 reflex; Future  -     Follicle stimulating hormone; Future    Submucous leiomyoma of uterus  -     US pelvis complete non OB; Future  -     CBC; Future  -     hCG, quantitative; Future  -     TSH, 3rd generation with Free T4 reflex; Future  -     Follicle stimulating hormone; Future        Patient with known history of fibroid uterus  She has been asymptomatic until January again  She is currently not bleeding  Repeated a pelvic ultrasound to check uterine fibroid size  Also ordered a CBC and FSH to rule out anemia as well as check menopausal status  She was asked to return to the office today for discussion of results as well as to review treatment options including surgical options including hysterectomy  All questions were answered  Subjective   Patient ID: Abdi Mckeon is a 46 y o  female  She is a 47 y/o  who presents with bleeding since   She states the bleeding was light to moderate and finally stopped today  She has a h/o D and C hysteroscopy in Sep 2019 that showed a submucosal fibroid posteriorly  Endometrial curetting's was negative for dysplasia  FSH was non menopausal in 2018  OB History        3    Para   1    Term   1       0    AB   2    Living   1       SAB   0    TAB   1    Ectopic   0    Multiple   0    Live Births   1                   Review of Systems   Constitutional: Negative  HENT: Negative  Eyes: Negative  Respiratory: Negative  Cardiovascular: Negative  Gastrointestinal: Negative  Endocrine: Negative  Genitourinary:        As noted in HPI   Musculoskeletal: Negative  Skin: Negative  Allergic/Immunologic: Negative  Neurological: Negative  Hematological: Negative  Psychiatric/Behavioral: Negative              Past Medical History:   Diagnosis Date    Abnormal mammogram of right breast 08/2018    f/u 6 mos    Herpes     Submucous leiomyoma of uterus 9/19/2018       Past Surgical History:   Procedure Laterality Date    BREAST SURGERY Bilateral 2012    breast lift    COLONOSCOPY W/ POLYPECTOMY  08/23/2018    repeat 5 yrs    DILATION AND CURETTAGE OF UTERUS      MI HYSTEROSCOPY,W/ENDO BX N/A 10/11/2018    Procedure: D&C, HYSTEROSCOPY;  Surgeon: Stephanie East MD;  Location: [facility] OR;  Service: Gynecology       Social History     Socioeconomic History    Marital status: /Civil Union     Spouse name: Not on file    Number of children: Not on file    Years of education: Not on file    Highest education level: Not on file   Occupational History    Not on file   Social Needs    Financial resource strain: Not on file    Food insecurity:     Worry: Not on file     Inability: Not on file    Transportation needs:     Medical: Not on file     Non-medical: Not on file   Tobacco Use    Smoking status: Never Smoker    Smokeless tobacco: Never Used   Substance and Sexual Activity    Alcohol use: No    Drug use: No    Sexual activity: Yes     Partners: Male     Birth control/protection: Female Sterilization   Lifestyle    Physical activity:     Days per week: Not on file     Minutes per session: Not on file    Stress: Not on file   Relationships    Social connections:     Talks on phone: Not on file     Gets together: Not on file     Attends Taoism service: Not on file     Active member of club or organization: Not on file     Attends meetings of clubs or organizations: Not on file     Relationship status: Not on file    Intimate partner violence:     Fear of current or ex partner: Not on file     Emotionally abused: Not on file     Physically abused: Not on file     Forced sexual activity: Not on file   Other Topics Concern    Not on file   Social History Narrative    Not on file       Allergies   Allergen Reactions    No Known Allergies          Current Outpatient Medications:     conjugated estrogens (PREMARIN) vaginal cream, Insert 0 5 g into the vagina daily, Disp: 30 g, Rfl: 5    ergocalciferol (VITAMIN D2) 50,000 units, TAKE 1 CAPSULE BY MOUTH EVERY WEEK, Disp: 13 capsule, Rfl: 0    naproxen (NAPROSYN) 500 mg tablet, Take 1 tablet (500 mg total) by mouth 2 (two) times a day with meals, Disp: 60 tablet, Rfl: 0    VALTREX 1 g tablet, Take 1 tablet (1,000 mg total) by mouth daily for 30 days, Disp: 30 tablet, Rfl: 5      Vitals:    19 1136   BP: 112/76       Body mass index is 24 94 kg/m²  Weight (last 2 days)     Date/Time   Weight    19 1136   59 9 (132)              Physical Exam   Constitutional: She is oriented to person, place, and time  She appears well-developed and well-nourished  No distress  Genitourinary: Uterus normal  Pelvic exam was performed with patient supine  There is no rash, tenderness, lesion or injury on the right labia  There is no rash, tenderness, lesion or injury on the left labia  No erythema, tenderness or bleeding in the vagina  No signs of injury around the vagina  No vaginal discharge found  Right adnexum does not display mass, does not display tenderness and does not display fullness  Left adnexum does not display mass, does not display tenderness and does not display fullness  Cervix does not exhibit motion tenderness, lesion, discharge or polyp  Uterus is not enlarged or tender  Abdominal: Soft  She exhibits no distension  There is no tenderness  Neurological: She is alert and oriented to person, place, and time  Skin: Skin is warm and dry  Psychiatric: She has a normal mood and affect   Her behavior is normal          Menstrual History:  OB History        3    Para   1    Term   1       0    AB   2    Living   1       SAB   0    TAB   1    Ectopic   0    Multiple   0    Live Births   1  x 1      Patient's last menstrual period was 2019         The following portions of the patient's history were reviewed and updated as appropriate: allergies, current medications, past family history, past medical history, past social history, past surgical history and problem list

## 2019-04-01 ENCOUNTER — HOSPITAL ENCOUNTER (OUTPATIENT)
Dept: ULTRASOUND IMAGING | Facility: HOSPITAL | Age: 49
Discharge: HOME | End: 2019-04-01
Attending: OBSTETRICS & GYNECOLOGY
Payer: COMMERCIAL

## 2019-04-01 ENCOUNTER — APPOINTMENT (OUTPATIENT)
Dept: LAB | Facility: HOSPITAL | Age: 49
End: 2019-04-01
Attending: OBSTETRICS & GYNECOLOGY
Payer: COMMERCIAL

## 2019-04-01 DIAGNOSIS — N93.9 ABNORMAL UTERINE BLEEDING (AUB): ICD-10-CM

## 2019-04-01 DIAGNOSIS — D25.0 SUBMUCOUS LEIOMYOMA OF UTERUS: ICD-10-CM

## 2019-04-01 PROBLEM — M25.561 ACUTE PAIN OF RIGHT KNEE: Status: ACTIVE | Noted: 2019-04-01

## 2019-04-01 PROBLEM — M79.672 PAIN IN BOTH FEET: Status: ACTIVE | Noted: 2019-04-01

## 2019-04-01 PROBLEM — M79.671 PAIN IN BOTH FEET: Status: ACTIVE | Noted: 2019-04-01

## 2019-04-01 LAB
B-HCG SERPL-ACNC: <2 MIU/ML
ERYTHROCYTE [DISTWIDTH] IN BLOOD BY AUTOMATED COUNT: 11.8 % (ref 11.6–15.1)
FSH SERPL-ACNC: 32.1 MIU/ML
HCT VFR BLD AUTO: 39.4 % (ref 34.8–46.1)
HGB BLD-MCNC: 12.6 G/DL (ref 11.5–15.4)
MCH RBC QN AUTO: 29.9 PG (ref 26.8–34.3)
MCHC RBC AUTO-ENTMCNC: 32 G/DL (ref 31.4–37.4)
MCV RBC AUTO: 93 FL (ref 82–98)
PLATELET # BLD AUTO: 220 THOUSANDS/UL (ref 149–390)
PMV BLD AUTO: 10.6 FL (ref 8.9–12.7)
RBC # BLD AUTO: 4.22 MILLION/UL (ref 3.81–5.12)
TSH SERPL DL<=0.05 MIU/L-ACNC: 1.38 UIU/ML (ref 0.36–3.74)
WBC # BLD AUTO: 5.08 THOUSAND/UL (ref 4.31–10.16)

## 2019-04-01 PROCEDURE — 83001 ASSAY OF GONADOTROPIN (FSH): CPT

## 2019-04-01 PROCEDURE — 76856 US EXAM PELVIC COMPLETE: CPT

## 2019-04-01 PROCEDURE — 84702 CHORIONIC GONADOTROPIN TEST: CPT

## 2019-04-01 PROCEDURE — 36415 COLL VENOUS BLD VENIPUNCTURE: CPT

## 2019-04-01 PROCEDURE — 85027 COMPLETE CBC AUTOMATED: CPT

## 2019-04-01 PROCEDURE — 76830 TRANSVAGINAL US NON-OB: CPT

## 2019-04-01 PROCEDURE — 84443 ASSAY THYROID STIM HORMONE: CPT

## 2019-04-02 ENCOUNTER — TELEPHONE (OUTPATIENT)
Dept: OBGYN CLINIC | Facility: CLINIC | Age: 49
End: 2019-04-02

## 2019-04-03 ENCOUNTER — TELEPHONE (OUTPATIENT)
Dept: OBGYN CLINIC | Facility: CLINIC | Age: 49
End: 2019-04-03

## 2019-04-08 ENCOUNTER — OFFICE VISIT (OUTPATIENT)
Dept: OBGYN CLINIC | Facility: CLINIC | Age: 49
End: 2019-04-08
Payer: COMMERCIAL

## 2019-04-08 VITALS
DIASTOLIC BLOOD PRESSURE: 68 MMHG | BODY MASS INDEX: 24.92 KG/M2 | SYSTOLIC BLOOD PRESSURE: 108 MMHG | WEIGHT: 132 LBS | HEIGHT: 61 IN

## 2019-04-08 DIAGNOSIS — N83.202 CYST OF LEFT OVARY: ICD-10-CM

## 2019-04-08 DIAGNOSIS — D25.0 SUBMUCOUS LEIOMYOMA OF UTERUS: ICD-10-CM

## 2019-04-08 DIAGNOSIS — D25.0 FIBROIDS, SUBMUCOSAL: ICD-10-CM

## 2019-04-08 DIAGNOSIS — N93.9 ABNORMAL UTERINE BLEEDING (AUB): Primary | ICD-10-CM

## 2019-04-08 DIAGNOSIS — N93.9 ABNORMAL UTERINE BLEEDING: ICD-10-CM

## 2019-04-08 PROCEDURE — 99213 OFFICE O/P EST LOW 20 MIN: CPT | Performed by: OBSTETRICS & GYNECOLOGY

## 2019-04-08 RX ORDER — MISOPROSTOL 200 UG/1
200 TABLET ORAL DAILY
Qty: 2 TABLET | Refills: 0 | Status: SHIPPED | OUTPATIENT
Start: 2019-04-08 | End: 2019-07-23 | Stop reason: ALTCHOICE

## 2019-04-29 ENCOUNTER — OFFICE VISIT (OUTPATIENT)
Dept: FAMILY MEDICINE CLINIC | Facility: CLINIC | Age: 49
End: 2019-04-29
Payer: COMMERCIAL

## 2019-04-29 VITALS
WEIGHT: 137.4 LBS | HEIGHT: 61 IN | TEMPERATURE: 97.4 F | SYSTOLIC BLOOD PRESSURE: 130 MMHG | RESPIRATION RATE: 20 BRPM | HEART RATE: 90 BPM | BODY MASS INDEX: 25.94 KG/M2 | DIASTOLIC BLOOD PRESSURE: 80 MMHG | OXYGEN SATURATION: 99 %

## 2019-04-29 DIAGNOSIS — Z00.01 ENCOUNTER FOR GENERAL ADULT MEDICAL EXAMINATION WITH ABNORMAL FINDINGS: Primary | ICD-10-CM

## 2019-04-29 DIAGNOSIS — Z12.39 SCREENING FOR BREAST CANCER: ICD-10-CM

## 2019-04-29 DIAGNOSIS — R92.8 ABNORMAL MAMMOGRAM: ICD-10-CM

## 2019-04-29 DIAGNOSIS — Z11.59 NEED FOR HEPATITIS C SCREENING TEST: ICD-10-CM

## 2019-04-29 PROCEDURE — 99396 PREV VISIT EST AGE 40-64: CPT | Performed by: FAMILY MEDICINE

## 2019-05-06 PROBLEM — Z00.01 ENCOUNTER FOR GENERAL ADULT MEDICAL EXAMINATION WITH ABNORMAL FINDINGS: Status: ACTIVE | Noted: 2019-05-06

## 2019-05-07 ENCOUNTER — HOSPITAL ENCOUNTER (OUTPATIENT)
Dept: MAMMOGRAPHY | Facility: CLINIC | Age: 49
Discharge: HOME | End: 2019-05-07
Payer: COMMERCIAL

## 2019-05-07 DIAGNOSIS — R92.8 ABNORMAL MAMMOGRAM: ICD-10-CM

## 2019-05-07 PROCEDURE — 76642 ULTRASOUND BREAST LIMITED: CPT

## 2019-07-23 ENCOUNTER — APPOINTMENT (OUTPATIENT)
Dept: LAB | Facility: HOSPITAL | Age: 49
End: 2019-07-23
Payer: COMMERCIAL

## 2019-07-23 ENCOUNTER — OFFICE VISIT (OUTPATIENT)
Dept: FAMILY MEDICINE CLINIC | Facility: CLINIC | Age: 49
End: 2019-07-23
Payer: COMMERCIAL

## 2019-07-23 VITALS
RESPIRATION RATE: 18 BRPM | TEMPERATURE: 97.7 F | BODY MASS INDEX: 24.92 KG/M2 | SYSTOLIC BLOOD PRESSURE: 110 MMHG | DIASTOLIC BLOOD PRESSURE: 80 MMHG | HEIGHT: 61 IN | HEART RATE: 84 BPM | OXYGEN SATURATION: 98 % | WEIGHT: 132 LBS

## 2019-07-23 DIAGNOSIS — Z23 NEED FOR TDAP VACCINATION: ICD-10-CM

## 2019-07-23 DIAGNOSIS — M53.3 SACROILIAC JOINT PAIN: ICD-10-CM

## 2019-07-23 DIAGNOSIS — R89.9 ABNORMAL LABORATORY TEST RESULT: ICD-10-CM

## 2019-07-23 DIAGNOSIS — L65.9 ALOPECIA OF SCALP: ICD-10-CM

## 2019-07-23 DIAGNOSIS — R79.89 LOW VITAMIN D LEVEL: ICD-10-CM

## 2019-07-23 DIAGNOSIS — R73.9 HYPERGLYCEMIA: ICD-10-CM

## 2019-07-23 DIAGNOSIS — R53.83 OTHER FATIGUE: ICD-10-CM

## 2019-07-23 DIAGNOSIS — K21.9 GASTROESOPHAGEAL REFLUX DISEASE WITHOUT ESOPHAGITIS: ICD-10-CM

## 2019-07-23 DIAGNOSIS — Z13.220 SCREENING FOR CHOLESTEROL LEVEL: ICD-10-CM

## 2019-07-23 DIAGNOSIS — Z12.31 SCREENING MAMMOGRAM, ENCOUNTER FOR: ICD-10-CM

## 2019-07-23 DIAGNOSIS — A60.00 GENITAL HERPES SIMPLEX, UNSPECIFIED SITE: ICD-10-CM

## 2019-07-23 DIAGNOSIS — L70.8 OTHER ACNE: ICD-10-CM

## 2019-07-23 DIAGNOSIS — R53.83 OTHER FATIGUE: Primary | ICD-10-CM

## 2019-07-23 LAB
ALBUMIN SERPL BCP-MCNC: 4.3 G/DL (ref 3–5.2)
ALP SERPL-CCNC: 43 U/L (ref 43–122)
ALT SERPL W P-5'-P-CCNC: 12 U/L (ref 9–52)
ANION GAP SERPL CALCULATED.3IONS-SCNC: 7 MMOL/L (ref 5–14)
AST SERPL W P-5'-P-CCNC: 22 U/L (ref 14–36)
BASOPHILS # BLD AUTO: 0 THOUSANDS/ΜL (ref 0–0.1)
BASOPHILS NFR BLD AUTO: 1 % (ref 0–1)
BILIRUB SERPL-MCNC: 0.5 MG/DL
BUN SERPL-MCNC: 7 MG/DL (ref 5–25)
CALCIUM SERPL-MCNC: 9 MG/DL (ref 8.4–10.2)
CHLORIDE SERPL-SCNC: 103 MMOL/L (ref 97–108)
CHOLEST SERPL-MCNC: 187 MG/DL
CO2 SERPL-SCNC: 29 MMOL/L (ref 22–30)
CREAT SERPL-MCNC: 0.66 MG/DL (ref 0.6–1.2)
EOSINOPHIL # BLD AUTO: 0 THOUSAND/ΜL (ref 0–0.4)
EOSINOPHIL NFR BLD AUTO: 1 % (ref 0–6)
ERYTHROCYTE [DISTWIDTH] IN BLOOD BY AUTOMATED COUNT: 12.8 %
EST. AVERAGE GLUCOSE BLD GHB EST-MCNC: 100 MG/DL
GFR SERPL CREATININE-BSD FRML MDRD: 103 ML/MIN/1.73SQ M
GLUCOSE P FAST SERPL-MCNC: 81 MG/DL (ref 70–99)
HBA1C MFR BLD: 5.1 % (ref 4.2–6.3)
HCT VFR BLD AUTO: 43 % (ref 36–46)
HDLC SERPL-MCNC: 66 MG/DL (ref 40–59)
HGB BLD-MCNC: 14.3 G/DL (ref 12–16)
LDLC SERPL CALC-MCNC: 96 MG/DL
LYMPHOCYTES # BLD AUTO: 1.4 THOUSANDS/ΜL (ref 0.5–4)
LYMPHOCYTES NFR BLD AUTO: 42 % (ref 25–45)
MCH RBC QN AUTO: 30.1 PG (ref 26–34)
MCHC RBC AUTO-ENTMCNC: 33.1 G/DL (ref 31–36)
MCV RBC AUTO: 91 FL (ref 80–100)
MONOCYTES # BLD AUTO: 0.3 THOUSAND/ΜL (ref 0.2–0.9)
MONOCYTES NFR BLD AUTO: 8 % (ref 1–10)
NEUTROPHILS # BLD AUTO: 1.6 THOUSANDS/ΜL (ref 1.8–7.8)
NEUTS SEG NFR BLD AUTO: 49 % (ref 45–65)
NONHDLC SERPL-MCNC: 121 MG/DL
PLATELET # BLD AUTO: 249 THOUSANDS/UL (ref 150–450)
PMV BLD AUTO: 9.3 FL (ref 8.9–12.7)
POTASSIUM SERPL-SCNC: 4.4 MMOL/L (ref 3.6–5)
PROT SERPL-MCNC: 7.2 G/DL (ref 5.9–8.4)
RBC # BLD AUTO: 4.74 MILLION/UL (ref 4–5.2)
SODIUM SERPL-SCNC: 139 MMOL/L (ref 137–147)
TRIGL SERPL-MCNC: 127 MG/DL
TSH SERPL DL<=0.05 MIU/L-ACNC: 1.1 UIU/ML (ref 0.47–4.68)
WBC # BLD AUTO: 3.4 THOUSAND/UL (ref 4.5–11)

## 2019-07-23 PROCEDURE — 99213 OFFICE O/P EST LOW 20 MIN: CPT | Performed by: NURSE PRACTITIONER

## 2019-07-23 PROCEDURE — 36415 COLL VENOUS BLD VENIPUNCTURE: CPT

## 2019-07-23 PROCEDURE — 84443 ASSAY THYROID STIM HORMONE: CPT

## 2019-07-23 PROCEDURE — 85025 COMPLETE CBC W/AUTO DIFF WBC: CPT

## 2019-07-23 PROCEDURE — 86618 LYME DISEASE ANTIBODY: CPT

## 2019-07-23 PROCEDURE — 80053 COMPREHEN METABOLIC PANEL: CPT

## 2019-07-23 PROCEDURE — 83036 HEMOGLOBIN GLYCOSYLATED A1C: CPT

## 2019-07-23 PROCEDURE — 80061 LIPID PANEL: CPT

## 2019-07-23 PROCEDURE — 82306 VITAMIN D 25 HYDROXY: CPT

## 2019-07-23 PROCEDURE — 86308 HETEROPHILE ANTIBODY SCREEN: CPT

## 2019-07-23 RX ORDER — VALACYCLOVIR HCL 1000 MG
1000 TABLET ORAL DAILY
Qty: 30 TABLET | Refills: 5 | Status: SHIPPED | OUTPATIENT
Start: 2019-07-23 | End: 2019-07-23 | Stop reason: SDUPTHER

## 2019-07-23 RX ORDER — FAMOTIDINE 40 MG/1
40 TABLET, FILM COATED ORAL DAILY
Qty: 30 TABLET | Refills: 2 | Status: SHIPPED | OUTPATIENT
Start: 2019-07-23

## 2019-07-23 RX ORDER — ERGOCALCIFEROL 1.25 MG/1
50000 CAPSULE ORAL WEEKLY
Qty: 4 CAPSULE | Refills: 5 | Status: SHIPPED | OUTPATIENT
Start: 2019-07-23 | End: 2019-10-24 | Stop reason: SDUPTHER

## 2019-07-23 RX ORDER — ERYTHROMYCIN AND BENZOYL PEROXIDE 30; 50 MG/G; MG/G
GEL TOPICAL 2 TIMES DAILY
Qty: 46.6 G | Refills: 0 | Status: SHIPPED | OUTPATIENT
Start: 2019-07-23

## 2019-07-23 RX ORDER — VALACYCLOVIR HCL 1000 MG
1000 TABLET ORAL DAILY
Qty: 30 TABLET | Refills: 5 | Status: SHIPPED | OUTPATIENT
Start: 2019-07-23 | End: 2019-10-24 | Stop reason: SDUPTHER

## 2019-07-23 NOTE — ASSESSMENT & PLAN NOTE
-Referral given to dermatologist for further evaluation and possible injections for hair growth as this is a recurring problem  I am ordering TSH, CBC on patient as well

## 2019-07-23 NOTE — ASSESSMENT & PLAN NOTE
-Ordering labs for work up of fatigue  Lymes, CBC, CMP, TSH, Vitamin D  Will follow up with patient

## 2019-07-23 NOTE — PROGRESS NOTES
Assessment/Plan: Alopecia of scalp  -Referral given to dermatologist for further evaluation and possible injections for hair growth as this is a recurring problem  I am ordering TSH, CBC on patient as well  Other fatigue  -Ordering labs for work up of fatigue  Lymes, CBC, CMP, TSH, Vitamin D  Will follow up with patient  Diagnoses and all orders for this visit:    Other fatigue  -     CBC and differential; Future  -     TSH, 3rd generation with Free T4 reflex; Future  -     Mononucleosis screen; Future  -     Lyme Antibody Profile with reflex to WB; Future  -     Vitamin D 25 hydroxy; Future    Alopecia of scalp  -     Ambulatory referral to Dermatology; Future    Screening mammogram, encounter for  -     Mammo screening bilateral w cad; Future    Screening for cholesterol level  -     Lipid panel; Future    Need for Tdap vaccination  -     TDAP VACCINE GREATER THAN OR EQUAL TO 6YO IM    Abnormal laboratory test result  -     Comprehensive metabolic panel; Future    Hyperglycemia  -     Hemoglobin A1C; Future    Genital herpes simplex, unspecified site  -     Discontinue: VALTREX 1 g tablet; Take 1 tablet (1,000 mg total) by mouth daily for 30 days  -     VALTREX 1 g tablet; Take 1 tablet (1,000 mg total) by mouth daily for 30 days    Low vitamin D level  -     ergocalciferol (VITAMIN D2) 50,000 units; Take 1 capsule (50,000 Units total) by mouth once a week    Sacroiliac joint pain  -     conjugated estrogens (PREMARIN) vaginal cream; Insert 0 5 g into the vagina daily    Other acne  -     benzoyl peroxide-erythromycin (BENZAMYCIN) gel; Apply topically 2 (two) times a day    Gastroesophageal reflux disease without esophagitis  -     famotidine (PEPCID) 40 MG tablet; Take 1 tablet (40 mg total) by mouth daily          Subjective:      Patient ID: Dung Henao is a 46 y o  female  Cc  Per pt, x 6 months tired, weight loss and hair loss    46year old female patinet presents today with hair loss and thinning  This started about 3 months now  [place]mits [place]  States that in 2017 she had this same issue and went to see a dermatologist and she injected her and it resolved  Its back now per patient  States that her scalp is itching  Fatigue   This is a new problem  The current episode started more than 1 month ago  The problem occurs constantly  The problem has been waxing and waning  Associated symptoms include arthralgias, fatigue and weakness  Pertinent negatives include no abdominal pain, chest pain, chills, congestion, coughing, fever, headaches, myalgias, nausea, rash, swollen glands, vertigo, visual change or vomiting  The symptoms are aggravated by eating  She has tried nothing for the symptoms  The treatment provided no relief  The following portions of the patient's history were reviewed and updated as appropriate: allergies, current medications, past family history, past medical history, past social history, past surgical history and problem list     Review of Systems   Constitutional: Positive for fatigue  Negative for chills and fever  HENT: Negative  Negative for congestion  Hair thining   Eyes: Negative  Respiratory: Negative  Negative for cough  Cardiovascular: Negative  Negative for chest pain and palpitations  Gastrointestinal: Negative  Negative for abdominal pain, nausea and vomiting  Endocrine: Negative  Genitourinary: Negative  Musculoskeletal: Positive for arthralgias  Negative for myalgias  Skin: Negative  Negative for color change and rash  Allergic/Immunologic: Negative  Neurological: Positive for weakness  Negative for dizziness, vertigo and headaches  Hematological: Negative  Psychiatric/Behavioral: Negative            Objective:      /80 (BP Location: Left arm, Patient Position: Sitting, Cuff Size: Standard)   Pulse 84   Temp 97 7 °F (36 5 °C) (Temporal)   Resp 18   Ht 5' 1" (1 549 m)   Wt 59 9 kg (132 lb)   SpO2 98%   BMI 24 94 kg/m²          Physical Exam   Constitutional: She is oriented to person, place, and time  She appears well-developed and well-nourished  No distress  HENT:   Head: Normocephalic and atraumatic  Right Ear: Hearing and tympanic membrane normal    Left Ear: Hearing and tympanic membrane normal    Nose: Nose normal    Eyes: Pupils are equal, round, and reactive to light  Conjunctivae and EOM are normal    Neck: Normal range of motion  Neck supple  Cardiovascular: Normal rate, regular rhythm, normal heart sounds and intact distal pulses  Pulmonary/Chest: Effort normal and breath sounds normal  No respiratory distress  She has no wheezes  She has no rales  Abdominal: Soft  Bowel sounds are normal    Musculoskeletal: Normal range of motion  Lymphadenopathy:     She has no cervical adenopathy  Neurological: She is alert and oriented to person, place, and time  She has normal reflexes  Skin: Skin is warm and dry  Capillary refill takes less than 2 seconds  She is not diaphoretic  Psychiatric: She has a normal mood and affect  Her behavior is normal  Judgment and thought content normal    Nursing note and vitals reviewed

## 2019-07-24 LAB
25(OH)D3 SERPL-MCNC: 40.5 NG/ML (ref 30–100)
HETEROPH AB SER QL: NEGATIVE

## 2019-07-25 LAB
B BURGDOR IGG SER IA-ACNC: 0.05
B BURGDOR IGM SER IA-ACNC: 0.15

## 2019-10-24 ENCOUNTER — OFFICE VISIT (OUTPATIENT)
Dept: FAMILY MEDICINE CLINIC | Facility: CLINIC | Age: 49
End: 2019-10-24
Payer: COMMERCIAL

## 2019-10-24 VITALS
SYSTOLIC BLOOD PRESSURE: 110 MMHG | HEIGHT: 61 IN | HEART RATE: 85 BPM | TEMPERATURE: 97.5 F | DIASTOLIC BLOOD PRESSURE: 80 MMHG | OXYGEN SATURATION: 98 % | BODY MASS INDEX: 26.24 KG/M2 | WEIGHT: 139 LBS

## 2019-10-24 DIAGNOSIS — K04.7 DENTAL ABSCESS: Primary | ICD-10-CM

## 2019-10-24 DIAGNOSIS — R79.89 LOW VITAMIN D LEVEL: ICD-10-CM

## 2019-10-24 DIAGNOSIS — A60.00 GENITAL HERPES SIMPLEX, UNSPECIFIED SITE: ICD-10-CM

## 2019-10-24 DIAGNOSIS — Z12.39 SCREENING FOR BREAST CANCER: ICD-10-CM

## 2019-10-24 PROCEDURE — 99214 OFFICE O/P EST MOD 30 MIN: CPT | Performed by: FAMILY MEDICINE

## 2019-10-24 RX ORDER — AMOXICILLIN 500 MG/1
500 CAPSULE ORAL EVERY 8 HOURS SCHEDULED
Qty: 30 CAPSULE | Refills: 0 | Status: SHIPPED | OUTPATIENT
Start: 2019-10-24 | End: 2019-11-03

## 2019-10-24 RX ORDER — VALACYCLOVIR HCL 1000 MG
1000 TABLET ORAL DAILY
Qty: 30 TABLET | Refills: 5 | Status: SHIPPED | OUTPATIENT
Start: 2019-10-24 | End: 2019-10-24 | Stop reason: SDUPTHER

## 2019-10-24 RX ORDER — VALACYCLOVIR HCL 1000 MG
1000 TABLET ORAL DAILY
Qty: 30 TABLET | Refills: 5 | Status: SHIPPED | OUTPATIENT
Start: 2019-10-24 | End: 2020-04-14

## 2019-10-24 RX ORDER — ERGOCALCIFEROL 1.25 MG/1
50000 CAPSULE ORAL WEEKLY
Qty: 4 CAPSULE | Refills: 5 | Status: SHIPPED | OUTPATIENT
Start: 2019-10-24

## 2019-10-24 NOTE — PROGRESS NOTES
Assessment/Plan:  1  Genital herpes simplex, unspecified site  Continue on preventive flare ups  - VALTREX 1 g tablet; Take 1 tablet (1,000 mg total) by mouth daily Please brand name necessary  Dispense: 30 tablet; Refill: 5    2  Low vitamin D level  I explained to patient that Vitamin-D deficiency can affect their bones  For most people vitamin D is not of concern  However people with obesity or with darker skin might have more impact  Vitamin D active is obtain from diet and exposure sunlight  Replacing vitamin-D is appropriate, but having high levels does not improve benefits  - ergocalciferol (VITAMIN D2) 50,000 units; Take 1 capsule (50,000 Units total) by mouth once a week  Dispense: 4 capsule; Refill: 5    3  Dental abscess  Follow up with dentist  She will need a consultation with dental surgeon due to affection of TMJ>, to cover with ab in previous dental work  - amoxicillin (AMOXIL) 500 mg capsule; Take 1 capsule (500 mg total) by mouth every 8 (eight) hours for 10 days  Dispense: 30 capsule; Refill: 0    4  Screening for breast cancer  Patient was encouraged to have Breast cancer screening, mammogram order given  - Mammo screening bilateral w cad; Future    No problem-specific Assessment & Plan notes found for this encounter  Diagnoses and all orders for this visit:    Genital herpes simplex, unspecified site  -     VALTREX 1 g tablet; Take 1 tablet (1,000 mg total) by mouth daily    Low vitamin D level  -     ergocalciferol (VITAMIN D2) 50,000 units; Take 1 capsule (50,000 Units total) by mouth once a week          Subjective:      Patient ID: Tianna Mckay is a 44 y o  female  She has pain in both TMJ, implant in 2016, left superior having discharge and pain  She suffers of Herpes infection (genitalia) and it is under control with prevention of flare ups with valtrex  Previous test shower low vitamin D        The following portions of the patient's history were reviewed and updated as appropriate: allergies, current medications, past family history, past medical history, past social history, past surgical history and problem list     Review of Systems   Constitutional: Negative for diaphoresis, fatigue, fever and unexpected weight change  Respiratory: Negative for cough, chest tightness, shortness of breath and wheezing  Cardiovascular: Negative for chest pain, palpitations and leg swelling  Gastrointestinal: Negative for abdominal pain, blood in stool, nausea and vomiting  Genitourinary:        Genital herpes generic valtrex does not work  Neurological: Negative for dizziness, syncope, light-headedness and headaches  Hematological: Does not bruise/bleed easily  Psychiatric/Behavioral: Negative for behavioral problems, self-injury and sleep disturbance  The patient is not nervous/anxious  Objective:      /80 (BP Location: Left arm, Patient Position: Sitting, Cuff Size: Adult)   Pulse 85   Temp 97 5 °F (36 4 °C) (Oral)   Ht 5' 1" (1 549 m)   Wt 63 kg (139 lb)   LMP 10/23/2019 (Exact Date)   SpO2 98%   BMI 26 26 kg/m²          Physical Exam   HENT:   Nose: Nose normal    Mouth/Throat: Oropharynx is clear and moist  No oropharyngeal exudate  Eyes: Pupils are equal, round, and reactive to light  EOM are normal  Right eye exhibits no discharge  Left eye exhibits no discharge  No scleral icterus  Cardiovascular: Normal rate, regular rhythm, normal heart sounds and intact distal pulses  Exam reveals no friction rub  No murmur heard  Pulmonary/Chest: Effort normal  No respiratory distress  She has no wheezes  She has no rales  She exhibits no tenderness  Musculoskeletal: Normal range of motion  Neurological: She is alert  Skin: Skin is warm and dry  No rash noted  No erythema  Psychiatric: She has a normal mood and affect  Her behavior is normal    Nursing note and vitals reviewed

## 2020-04-14 ENCOUNTER — HOSPITAL ENCOUNTER (EMERGENCY)
Facility: HOSPITAL | Age: 50
Discharge: HOME | End: 2020-04-14
Attending: EMERGENCY MEDICINE | Admitting: EMERGENCY MEDICINE
Payer: COMMERCIAL

## 2020-04-14 VITALS
HEART RATE: 75 BPM | BODY MASS INDEX: 26.24 KG/M2 | WEIGHT: 138.89 LBS | OXYGEN SATURATION: 100 % | SYSTOLIC BLOOD PRESSURE: 126 MMHG | DIASTOLIC BLOOD PRESSURE: 66 MMHG | RESPIRATION RATE: 16 BRPM | TEMPERATURE: 98.3 F

## 2020-04-14 DIAGNOSIS — J06.9 UPPER RESPIRATORY INFECTION: Primary | ICD-10-CM

## 2020-04-14 DIAGNOSIS — Z20.822 EXPOSURE TO COVID-19 VIRUS: ICD-10-CM

## 2020-04-14 PROCEDURE — 99284 EMERGENCY DEPT VISIT MOD MDM: CPT | Performed by: PHYSICIAN ASSISTANT

## 2020-04-14 PROCEDURE — 87635 SARS-COV-2 COVID-19 AMP PRB: CPT | Performed by: PHYSICIAN ASSISTANT

## 2020-04-14 PROCEDURE — 99283 EMERGENCY DEPT VISIT LOW MDM: CPT

## 2020-04-14 RX ORDER — BENZONATATE 100 MG/1
100 CAPSULE ORAL 3 TIMES DAILY PRN
Qty: 21 CAPSULE | Refills: 0 | Status: SHIPPED | OUTPATIENT
Start: 2020-04-14

## 2020-04-15 LAB — SARS-COV-2 RNA SPEC QL NAA+PROBE: NOT DETECTED

## 2020-10-07 ENCOUNTER — TELEPHONE (OUTPATIENT)
Dept: FAMILY MEDICINE CLINIC | Facility: CLINIC | Age: 50
End: 2020-10-07

## 2021-02-18 ENCOUNTER — TELEPHONE (OUTPATIENT)
Dept: FAMILY MEDICINE CLINIC | Facility: CLINIC | Age: 51
End: 2021-02-18

## 2021-02-18 DIAGNOSIS — Z12.31 ENCOUNTER FOR SCREENING MAMMOGRAM FOR MALIGNANT NEOPLASM OF BREAST: Primary | ICD-10-CM

## 2021-02-20 ENCOUNTER — APPOINTMENT (EMERGENCY)
Dept: RADIOLOGY | Facility: HOSPITAL | Age: 51
End: 2021-02-20
Payer: MEDICARE

## 2021-02-20 ENCOUNTER — HOSPITAL ENCOUNTER (EMERGENCY)
Facility: HOSPITAL | Age: 51
Discharge: HOME | End: 2021-02-20
Attending: EMERGENCY MEDICINE | Admitting: EMERGENCY MEDICINE
Payer: MEDICARE

## 2021-02-20 VITALS
DIASTOLIC BLOOD PRESSURE: 62 MMHG | SYSTOLIC BLOOD PRESSURE: 116 MMHG | RESPIRATION RATE: 18 BRPM | BODY MASS INDEX: 26.83 KG/M2 | OXYGEN SATURATION: 100 % | TEMPERATURE: 97 F | HEART RATE: 73 BPM | WEIGHT: 142 LBS

## 2021-02-20 DIAGNOSIS — U07.1 COVID-19: Primary | ICD-10-CM

## 2021-02-20 LAB
ALBUMIN SERPL BCP-MCNC: 3.8 G/DL (ref 3–5.2)
ALP SERPL-CCNC: 48 U/L (ref 43–122)
ALT SERPL W P-5'-P-CCNC: 39 U/L (ref 9–52)
AMORPH URATE CRY URNS QL MICRO: ABNORMAL /HPF
ANION GAP SERPL CALCULATED.3IONS-SCNC: 3 MMOL/L (ref 5–14)
AST SERPL W P-5'-P-CCNC: 35 U/L (ref 14–36)
BACTERIA UR QL AUTO: ABNORMAL /HPF
BASOPHILS # BLD AUTO: 0.02 THOUSAND/UL (ref 0–0.1)
BASOPHILS NFR MAR MANUAL: 1 % (ref 0–1)
BILIRUB SERPL-MCNC: 0.3 MG/DL
BILIRUB UR QL STRIP: NEGATIVE
BUN SERPL-MCNC: 8 MG/DL (ref 5–25)
CALCIUM SERPL-MCNC: 8.8 MG/DL (ref 8.4–10.2)
CHLORIDE SERPL-SCNC: 108 MMOL/L (ref 97–108)
CK SERPL-CCNC: 36 U/L (ref 30–135)
CLARITY UR: CLEAR
CO2 SERPL-SCNC: 28 MMOL/L (ref 22–30)
COLOR UR: YELLOW
CREAT SERPL-MCNC: 0.73 MG/DL (ref 0.6–1.2)
ERYTHROCYTE [DISTWIDTH] IN BLOOD BY AUTOMATED COUNT: 12.9 %
EXT PREG TEST URINE: NEGATIVE
EXT. CONTROL ED NAV: NORMAL
FLUAV RNA RESP QL NAA+PROBE: NEGATIVE
FLUBV RNA RESP QL NAA+PROBE: NEGATIVE
GFR SERPL CREATININE-BSD FRML MDRD: 95 ML/MIN/1.73SQ M
GLUCOSE SERPL-MCNC: 88 MG/DL (ref 70–99)
GLUCOSE UR STRIP-MCNC: NEGATIVE MG/DL
HCT VFR BLD AUTO: 37.3 % (ref 36–46)
HGB BLD-MCNC: 12.2 G/DL (ref 12–16)
HGB UR QL STRIP.AUTO: 25
KETONES UR STRIP-MCNC: NEGATIVE MG/DL
LEUKOCYTE ESTERASE UR QL STRIP: NEGATIVE
LIPASE SERPL-CCNC: 37 U/L (ref 23–300)
LYMPHOCYTES # BLD AUTO: 1.26 THOUSAND/UL (ref 0.5–4)
LYMPHOCYTES # BLD AUTO: 60 % (ref 25–45)
MCH RBC QN AUTO: 28.9 PG (ref 26–34)
MCHC RBC AUTO-ENTMCNC: 32.6 G/DL (ref 31–36)
MCV RBC AUTO: 89 FL (ref 80–100)
MONOCYTES # BLD AUTO: 0.32 THOUSAND/UL (ref 0.2–0.9)
MONOCYTES NFR BLD AUTO: 15 % (ref 1–10)
MUCOUS THREADS UR QL AUTO: ABNORMAL
NEUTS SEG # BLD: 0.5 THOUSAND/UL (ref 1.8–7.8)
NEUTS SEG NFR BLD AUTO: 24 %
NITRITE UR QL STRIP: NEGATIVE
NON-SQ EPI CELLS URNS QL MICRO: ABNORMAL /HPF
PH UR STRIP.AUTO: 6 [PH]
PLATELET # BLD AUTO: 176 THOUSANDS/UL (ref 150–450)
PLATELET BLD QL SMEAR: ADEQUATE
PMV BLD AUTO: 9.1 FL (ref 8.9–12.7)
POTASSIUM SERPL-SCNC: 4.5 MMOL/L (ref 3.6–5)
PROT SERPL-MCNC: 6.6 G/DL (ref 5.9–8.4)
PROT UR STRIP-MCNC: NEGATIVE MG/DL
RBC # BLD AUTO: 4.22 MILLION/UL (ref 4–5.2)
RBC #/AREA URNS AUTO: ABNORMAL /HPF
RBC MORPH BLD: NORMAL
RSV RNA RESP QL NAA+PROBE: NEGATIVE
SARS-COV-2 RNA RESP QL NAA+PROBE: POSITIVE
SODIUM SERPL-SCNC: 139 MMOL/L (ref 137–147)
SP GR UR STRIP.AUTO: 1.02 (ref 1–1.04)
TOTAL CELLS COUNTED SPEC: 100
UROBILINOGEN UA: NEGATIVE MG/DL
WBC # BLD AUTO: 2.1 THOUSAND/UL (ref 4.5–11)
WBC #/AREA URNS AUTO: ABNORMAL /HPF

## 2021-02-20 PROCEDURE — 82550 ASSAY OF CK (CPK): CPT | Performed by: PHYSICIAN ASSISTANT

## 2021-02-20 PROCEDURE — 85027 COMPLETE CBC AUTOMATED: CPT | Performed by: PHYSICIAN ASSISTANT

## 2021-02-20 PROCEDURE — 80053 COMPREHEN METABOLIC PANEL: CPT | Performed by: PHYSICIAN ASSISTANT

## 2021-02-20 PROCEDURE — 96360 HYDRATION IV INFUSION INIT: CPT

## 2021-02-20 PROCEDURE — 99282 EMERGENCY DEPT VISIT SF MDM: CPT | Performed by: PHYSICIAN ASSISTANT

## 2021-02-20 PROCEDURE — 85007 BL SMEAR W/DIFF WBC COUNT: CPT | Performed by: PHYSICIAN ASSISTANT

## 2021-02-20 PROCEDURE — 36415 COLL VENOUS BLD VENIPUNCTURE: CPT | Performed by: PHYSICIAN ASSISTANT

## 2021-02-20 PROCEDURE — 0241U HB NFCT DS VIR RESP RNA 4 TRGT: CPT | Performed by: PHYSICIAN ASSISTANT

## 2021-02-20 PROCEDURE — 81003 URINALYSIS AUTO W/O SCOPE: CPT | Performed by: PHYSICIAN ASSISTANT

## 2021-02-20 PROCEDURE — 81025 URINE PREGNANCY TEST: CPT | Performed by: PHYSICIAN ASSISTANT

## 2021-02-20 PROCEDURE — 81001 URINALYSIS AUTO W/SCOPE: CPT | Performed by: PHYSICIAN ASSISTANT

## 2021-02-20 PROCEDURE — 83690 ASSAY OF LIPASE: CPT | Performed by: PHYSICIAN ASSISTANT

## 2021-02-20 PROCEDURE — 99285 EMERGENCY DEPT VISIT HI MDM: CPT

## 2021-02-20 PROCEDURE — 71045 X-RAY EXAM CHEST 1 VIEW: CPT

## 2021-02-20 RX ORDER — SODIUM CHLORIDE 9 MG/ML
250 INJECTION, SOLUTION INTRAVENOUS CONTINUOUS
Status: DISCONTINUED | OUTPATIENT
Start: 2021-02-20 | End: 2021-02-20 | Stop reason: HOSPADM

## 2021-02-20 RX ADMIN — SODIUM CHLORIDE 250 ML/HR: 0.9 INJECTION, SOLUTION INTRAVENOUS at 11:24

## 2021-02-20 NOTE — ED NOTES
Quarantine instructions given to patient through daughter who was on the phone  They verbalized understanding        Matheus Hester RN  02/20/21 1300

## 2021-02-20 NOTE — ED PROVIDER NOTES
History  Chief Complaint   Patient presents with    Weakness - Generalized     generalized bodyaches/weakness x2 days (+) covid contact     Pt with fatigue for several days and body aches, pt with known covid-19 exposure           Prior to Admission Medications   Prescriptions Last Dose Informant Patient Reported? Taking?    VALTREX 1 g tablet   No No   Sig: Take 1 tablet (1,000 mg total) by mouth daily Please brand name necessary   benzonatate (TESSALON PERLES) 100 mg capsule   No No   Sig: Take 1 capsule (100 mg total) by mouth 3 (three) times a day as needed for cough   Patient not taking: Reported on 2/22/2021   benzoyl peroxide-erythromycin (BENZAMYCIN) gel   No No   Sig: Apply topically 2 (two) times a day   Patient not taking: Reported on 2/22/2021   conjugated estrogens (PREMARIN) vaginal cream   No No   Sig: Insert 0 5 g into the vagina daily   Patient not taking: Reported on 2/22/2021   dextromethorphan-guaifenesin (MUCINEX DM)  MG per 12 hr tablet   No No   Sig: Take 1 tablet by mouth every 12 (twelve) hours as needed for cough   ergocalciferol (VITAMIN D2) 50,000 units   No No   Sig: Take 1 capsule (50,000 Units total) by mouth once a week   famotidine (PEPCID) 40 MG tablet   No No   Sig: Take 1 tablet (40 mg total) by mouth daily   Patient not taking: Reported on 2/22/2021      Facility-Administered Medications: None       Past Medical History:   Diagnosis Date    Abnormal mammogram of right breast 08/2018    f/u 6 mos    Herpes     Submucous leiomyoma of uterus 9/19/2018       Past Surgical History:   Procedure Laterality Date    BREAST SURGERY Bilateral 2012    breast lift    COLONOSCOPY W/ POLYPECTOMY  08/23/2018    repeat 5 yrs    DILATION AND CURETTAGE OF UTERUS      [place] HYSTEROSCOPY,W/ENDO BX N/A 10/11/2018    Procedure: D&C, HYSTEROSCOPY;  Surgeon: Michelle Bill MD;  Location: [facility];  Service: Gynecology       Family History   Problem Relation Age of Onset    No Known Problems Mother     No Known Problems Father      I have reviewed and agree with the history as documented  E-Cigarette/Vaping    E-Cigarette Use Never User      E-Cigarette/Vaping Substances     Social History     Tobacco Use    Smoking status: Never Smoker    Smokeless tobacco: Never Used   Substance Use Topics    Alcohol use: No    Drug use: No       Review of Systems   Constitutional: Positive for fatigue  HENT: Negative  Eyes: Negative  Respiratory: Negative  Cardiovascular: Negative  Gastrointestinal: Negative  Endocrine: Negative  Genitourinary: Negative  Musculoskeletal: Positive for myalgias  Skin: Negative  Allergic/Immunologic: Negative  Neurological: Negative  Hematological: Negative  Psychiatric/Behavioral: Negative  All other systems reviewed and are negative  Physical Exam  Physical Exam  Vitals signs and nursing note reviewed  Constitutional:       Appearance: Normal appearance  She is normal weight  Comments: Pt understands need to recheck  Wbc blood count with family doctor      HENT:      Head: Normocephalic and atraumatic  Right Ear: Tympanic membrane, ear canal and external ear normal       Left Ear: Tympanic membrane, ear canal and external ear normal       Nose: Nose normal       Mouth/Throat:      Mouth: Mucous membranes are moist       Pharynx: Oropharynx is clear  Eyes:      Extraocular Movements: Extraocular movements intact  Conjunctiva/sclera: Conjunctivae normal       Pupils: Pupils are equal, round, and reactive to light  Neck:      Musculoskeletal: Normal range of motion and neck supple  Cardiovascular:      Rate and Rhythm: Normal rate and regular rhythm  Pulses: Normal pulses  Heart sounds: Normal heart sounds  Pulmonary:      Effort: Pulmonary effort is normal       Breath sounds: Normal breath sounds  Abdominal:      General: Abdomen is flat  Bowel sounds are normal       Palpations: Abdomen is soft  Musculoskeletal: Normal range of motion  Skin:     General: Skin is warm  Capillary Refill: Capillary refill takes less than 2 seconds  Neurological:      General: No focal deficit present  Mental Status: She is alert and oriented to person, place, and time  Psychiatric:         Mood and Affect: Mood normal          Vital Signs  ED Triage Vitals [02/20/21 1028]   Temperature Pulse Respirations Blood Pressure SpO2   (!) 97 °F (36 1 °C) 73 18 (!) 173/87 100 %      Temp Source Heart Rate Source Patient Position - Orthostatic VS BP Location FiO2 (%)   Tympanic Monitor Sitting Left arm --      Pain Score       --           Vitals:    02/20/21 1028 02/20/21 1044   BP: (!) 173/87 116/62   Pulse: 73    Patient Position - Orthostatic VS: Sitting Lying         Visual Acuity      ED Medications  Medications - No data to display    Diagnostic Studies  Results Reviewed     Procedure Component Value Units Date/Time    COVID19, Influenza A/B, RSV PCR, [facility] [[id number]]  (Abnormal) Collected: 02/20/21 1119    Lab Status: Final result Specimen: Nares from Nose Updated: 02/20/21 1237     SARS-CoV-2 Positive     INFLUENZA A PCR Negative     INFLUENZA B PCR Negative     RSV PCR Negative    Narrative: This test has been authorized by FDA under an EUA (Emergency Use Assay) for use by authorized laboratories  Clinical caution and judgement should be used with the interpretation of these results with consideration of the clinical impression and other laboratory testing  Testing reported as "Positive" or "Negative" has been proven to be accurate according to standard laboratory validation requirements  All testing is performed with control materials showing appropriate reactivity at standard intervals      Manual Differential (Non Wam) [[id number]]  (Abnormal) Collected: 02/20/21 1119    Lab Status: Final result Specimen: Blood from Arm, Right Updated: 02/20/21 1223     Segmented % 24 %      Lymphocytes % 60 % Monocytes % 15 %      Basophils % 1 %      Neutrophils Absolute 0 50 Thousand/uL      Lymphocytes Absolute 1 26 Thousand/uL      Monocytes Absolute 0 32 Thousand/uL      Basophils Absolute 0 02 Thousand/uL      Total Counted 100     RBC Morphology Normal     Platelet Estimate Adequate    Urine Microscopic [[id number]]  (Abnormal) Collected: 02/20/21 1128    Lab Status: Final result Specimen: Urine, Clean Catch Updated: 02/20/21 1201     RBC, UA 2-4 /hpf      WBC, UA 1-2 /hpf      Epithelial Cells Moderate /hpf      Bacteria, UA Occasional /hpf      AMORPH URATES Occasional /hpf      MUCUS THREADS Occasional    CK (with reflex to MB) [[id number]]  (Normal) Collected: 02/20/21 1119    Lab Status: Final result Specimen: Blood from Arm, Right Updated: 02/20/21 1149     Total CK 36 U/L     Lipase [[id number]]  (Normal) Collected: 02/20/21 1119    Lab Status: Final result Specimen: Blood from Arm, Right Updated: 02/20/21 1149     Lipase 37 u/L     Comprehensive metabolic panel [[id number]]  (Abnormal) Collected: 02/20/21 1119    Lab Status: Final result Specimen: Blood from Arm, Right Updated: 02/20/21 1148     Sodium 139 mmol/L      Potassium 4 5 mmol/L      Chloride 108 mmol/L      CO2 28 mmol/L      ANION GAP 3 mmol/L      BUN 8 mg/dL      Creatinine 0 73 mg/dL      Glucose 88 mg/dL      Calcium 8 8 mg/dL      AST 35 U/L      ALT 39 U/L      Alkaline Phosphatase 48 U/L      Total Protein 6 6 g/dL      Albumin 3 8 g/dL      Total Bilirubin 0 30 mg/dL      eGFR 95 ml/min/1 73sq m     Narrative:      [place] guidelines for Chronic Kidney Disease (CKD):     Stage 1 with normal or high GFR (GFR > 90 mL/min/1 73 square meters)    Stage 2 Mild CKD (GFR = 60-89 mL/min/1 73 square meters)    Stage 3A Moderate CKD (GFR = 45-59 mL/min/1 73 square meters)    Stage 3B Moderate CKD (GFR = 30-44 mL/min/1 73 square meters)    Stage 4 Severe CKD (GFR = 15-29 mL/min/1 73 square meters)    Stage 5 End Stage CKD (GFR <15 mL/min/1 73 square meters)  Note: GFR calculation is accurate only with a steady state creatinine    UA w Reflex to Microscopic w Reflex to Culture [[id number]]  (Abnormal) Collected: 02/20/21 1128    Lab Status: Final result Specimen: Urine, Clean Catch Updated: 02/20/21 1146     Color, UA Yellow     Clarity, UA Clear     Specific [place], UA 1 020     pH, UA 6 0     Leukocytes, UA Negative     Nitrite, UA Negative     Protein, UA Negative mg/dl      Glucose, UA Negative mg/dl      Ketones, UA Negative mg/dl      Bilirubin, UA Negative     Blood, UA 25 0     UROBILINOGEN UA Negative mg/dL     CBC and differential [[id number]]  (Abnormal) Collected: 02/20/21 1119    Lab Status: Final result Specimen: Blood from Arm, Right Updated: 02/20/21 1137     WBC 2 10 Thousand/uL      RBC 4 22 Million/uL      Hemoglobin 12 2 g/dL      Hematocrit 37 3 %      MCV 89 fL      MCH 28 9 pg      MCHC 32 6 g/dL      RDW 12 9 %      MPV 9 1 fL      Platelets 102 Thousands/uL     POCT pregnancy, urine [[id number]]  (Normal) Resulted: 02/20/21 1119    Lab Status: Final result Updated: 02/20/21 1119     EXT PREG TEST UR (Ref: Negative) Negative     Control Valid                 XR chest 1 view portable   Final Result by Ramonita Herrera MD (02/20 1235)      No acute cardiopulmonary disease  Workstation performed: [id number]                    Procedures  Procedures         ED Course                             SBIRT 22yo+      Most Recent Value   SBIRT (22 yo +)   In order to provide better care to our patients, we are screening all of our patients for alcohol and drug use  Would it be okay to ask you these screening questions? Yes Filed at: 02/20/2021 1042   Initial Alcohol Screen: US AUDIT-C    1  How often do you have a drink containing alcohol?  0 Filed at: 02/20/2021 1042   2  How many drinks containing alcohol do you have on a typical day you are drinking? 0 Filed at: 02/20/2021 1042   3b   FEMALE Any Age, or MALE 65+: How often do you have 4 or more drinks on one occassion? 0 Filed at: 02/20/2021 1042   Audit-C Score  0 Filed at: 02/20/2021 1042   PRAFUL: How many times in the past year have you    Used an illegal drug or used a prescription medication for non-medical reasons? Never Filed at: 02/20/2021 1042                    MDM    Disposition  Final diagnoses:   [id number]     Time reflects when diagnosis was documented in both MDM as applicable and the Disposition within this note     Time User Action Codes Description Comment    2/20/2021 12:43 PM Wapobenoit Robertson  Add [U07 1] COVID-19       ED Disposition     ED Disposition Condition Date/Time Comment    Discharge Stable Sat Feb 20, 2021 12:43 PM Regina Keith discharge to home/self care              Follow-up Information     Follow up With Specialties Details Why Dalton Augustine MD Family Medicine  recheck with family doctor for repeat blood cound for wbc recheck  take vit c vit d and zinc supplements [address]  [phone]            Discharge Medication List as of 2/20/2021 12:45 PM      CONTINUE these medications which have NOT CHANGED    Details   benzonatate (TESSALON PERLES) 100 mg capsule Take 1 capsule (100 mg total) by mouth 3 (three) times a day as needed for cough, Starting Tue 4/14/2020, Normal      benzoyl peroxide-erythromycin (BENZAMYCIN) gel Apply topically 2 (two) times a day, Starting Tue 7/23/2019, Normal      conjugated estrogens (PREMARIN) vaginal cream Insert 0 5 g into the vagina daily, Starting Tue 7/23/2019, Normal      dextromethorphan-guaifenesin (MUCINEX DM)  MG per 12 hr tablet Take 1 tablet by mouth every 12 (twelve) hours as needed for cough, Starting Tue 4/14/2020, Normal      ergocalciferol (VITAMIN D2) 50,000 units Take 1 capsule (50,000 Units total) by mouth once a week, Starting Thu 10/24/2019, Normal      famotidine (PEPCID) 40 MG tablet Take 1 tablet (40 mg total) by mouth daily, Starting Tue 7/23/2019, Normal      VALTREX 1 g tablet Take 1 tablet (1,000 mg total) by mouth daily Please brand name necessary, Starting u 10/24/2019, Until Tue 4/14/2020, Normal           No discharge procedures on file      PDMP Review     None          ED Provider  Electronically Signed by           Jacquie Malone PA-C  02/23/21 3329

## 2021-02-22 ENCOUNTER — TELEPHONE (OUTPATIENT)
Dept: FAMILY MEDICINE CLINIC | Facility: CLINIC | Age: 51
End: 2021-02-22

## 2021-02-22 NOTE — TELEPHONE ENCOUNTER
Pt was called to inform positive COVID  She is aware but states she is currently seeing another PCP at [facility], Dr Vini Irvin and will f/u this week for a virtual when him

## 2021-02-24 NOTE — ED ATTENDING ATTESTATION
I was the attending physician on duty at the time the patient visited the emergency department  The patient was evaluated and dispositioned by the APC  I was personally available for consultation  I am administratively signing the chart after the fact      Kristin Melton MD

## 2021-03-09 ENCOUNTER — APPOINTMENT (EMERGENCY)
Dept: CT IMAGING | Facility: HOSPITAL | Age: 51
End: 2021-03-09
Payer: MEDICARE

## 2021-03-09 ENCOUNTER — HOSPITAL ENCOUNTER (EMERGENCY)
Facility: HOSPITAL | Age: 51
Discharge: HOME | End: 2021-03-09
Attending: EMERGENCY MEDICINE | Admitting: EMERGENCY MEDICINE
Payer: MEDICARE

## 2021-03-09 VITALS
HEART RATE: 65 BPM | DIASTOLIC BLOOD PRESSURE: 66 MMHG | OXYGEN SATURATION: 100 % | WEIGHT: 144 LBS | RESPIRATION RATE: 16 BRPM | BODY MASS INDEX: 27.19 KG/M2 | TEMPERATURE: 97.2 F | HEIGHT: 61 IN | SYSTOLIC BLOOD PRESSURE: 111 MMHG

## 2021-03-09 DIAGNOSIS — K52.9 COLITIS: Primary | ICD-10-CM

## 2021-03-09 LAB
ALBUMIN SERPL BCP-MCNC: 4.1 G/DL (ref 3–5.2)
ALP SERPL-CCNC: 44 U/L (ref 43–122)
ALT SERPL W P-5'-P-CCNC: 10 U/L (ref 9–52)
ANION GAP SERPL CALCULATED.3IONS-SCNC: 7 MMOL/L (ref 5–14)
AST SERPL W P-5'-P-CCNC: 24 U/L (ref 14–36)
BASOPHILS # BLD AUTO: 0 THOUSANDS/ΜL (ref 0–0.1)
BASOPHILS NFR BLD AUTO: 1 % (ref 0–1)
BILIRUB SERPL-MCNC: 0.6 MG/DL
BUN SERPL-MCNC: 6 MG/DL (ref 5–25)
CALCIUM SERPL-MCNC: 8.7 MG/DL (ref 8.4–10.2)
CHLORIDE SERPL-SCNC: 104 MMOL/L (ref 97–108)
CO2 SERPL-SCNC: 27 MMOL/L (ref 22–30)
CREAT SERPL-MCNC: 0.67 MG/DL (ref 0.6–1.2)
EOSINOPHIL # BLD AUTO: 0 THOUSAND/ΜL (ref 0–0.4)
EOSINOPHIL NFR BLD AUTO: 1 % (ref 0–6)
ERYTHROCYTE [DISTWIDTH] IN BLOOD BY AUTOMATED COUNT: 12.6 %
GFR SERPL CREATININE-BSD FRML MDRD: 101 ML/MIN/1.73SQ M
GLUCOSE SERPL-MCNC: 94 MG/DL (ref 70–99)
HCT VFR BLD AUTO: 36.4 % (ref 36–46)
HGB BLD-MCNC: 11.9 G/DL (ref 12–16)
LIPASE SERPL-CCNC: 38 U/L (ref 23–300)
LYMPHOCYTES # BLD AUTO: 1.9 THOUSANDS/ΜL (ref 0.5–4)
LYMPHOCYTES NFR BLD AUTO: 40 % (ref 25–45)
MCH RBC QN AUTO: 28.7 PG (ref 26–34)
MCHC RBC AUTO-ENTMCNC: 32.7 G/DL (ref 31–36)
MCV RBC AUTO: 88 FL (ref 80–100)
MONOCYTES # BLD AUTO: 0.3 THOUSAND/ΜL (ref 0.2–0.9)
MONOCYTES NFR BLD AUTO: 7 % (ref 1–10)
NEUTROPHILS # BLD AUTO: 2.3 THOUSANDS/ΜL (ref 1.8–7.8)
NEUTS SEG NFR BLD AUTO: 51 % (ref 45–65)
PLATELET # BLD AUTO: 213 THOUSANDS/UL (ref 150–450)
PMV BLD AUTO: 9 FL (ref 8.9–12.7)
POTASSIUM SERPL-SCNC: 3.7 MMOL/L (ref 3.6–5)
PROT SERPL-MCNC: 6.9 G/DL (ref 5.9–8.4)
RBC # BLD AUTO: 4.15 MILLION/UL (ref 4–5.2)
SODIUM SERPL-SCNC: 138 MMOL/L (ref 137–147)
WBC # BLD AUTO: 4.6 THOUSAND/UL (ref 4.5–11)

## 2021-03-09 PROCEDURE — 85025 COMPLETE CBC W/AUTO DIFF WBC: CPT | Performed by: EMERGENCY MEDICINE

## 2021-03-09 PROCEDURE — 99284 EMERGENCY DEPT VISIT MOD MDM: CPT

## 2021-03-09 PROCEDURE — 80053 COMPREHEN METABOLIC PANEL: CPT | Performed by: EMERGENCY MEDICINE

## 2021-03-09 PROCEDURE — 83690 ASSAY OF LIPASE: CPT | Performed by: EMERGENCY MEDICINE

## 2021-03-09 PROCEDURE — 96374 THER/PROPH/DIAG INJ IV PUSH: CPT

## 2021-03-09 PROCEDURE — 74176 CT ABD & PELVIS W/O CONTRAST: CPT

## 2021-03-09 PROCEDURE — 36415 COLL VENOUS BLD VENIPUNCTURE: CPT | Performed by: EMERGENCY MEDICINE

## 2021-03-09 PROCEDURE — 99285 EMERGENCY DEPT VISIT HI MDM: CPT | Performed by: EMERGENCY MEDICINE

## 2021-03-09 PROCEDURE — 96375 TX/PRO/DX INJ NEW DRUG ADDON: CPT

## 2021-03-09 RX ORDER — ONDANSETRON 2 MG/ML
4 INJECTION INTRAMUSCULAR; INTRAVENOUS ONCE
Status: COMPLETED | OUTPATIENT
Start: 2021-03-09 | End: 2021-03-09

## 2021-03-09 RX ORDER — CIPROFLOXACIN 750 MG/1
750 TABLET, FILM COATED ORAL 2 TIMES DAILY
Qty: 14 TABLET | Refills: 0 | Status: SHIPPED | OUTPATIENT
Start: 2021-03-09 | End: 2021-03-16

## 2021-03-09 RX ORDER — DICYCLOMINE HCL 20 MG
20 TABLET ORAL EVERY 6 HOURS PRN
Qty: 20 TABLET | Refills: 0 | Status: SHIPPED | OUTPATIENT
Start: 2021-03-09

## 2021-03-09 RX ORDER — MORPHINE SULFATE 4 MG/ML
4 INJECTION, SOLUTION INTRAMUSCULAR; INTRAVENOUS ONCE
Status: COMPLETED | OUTPATIENT
Start: 2021-03-09 | End: 2021-03-09

## 2021-03-09 RX ORDER — METRONIDAZOLE 500 MG/1
500 TABLET ORAL EVERY 8 HOURS SCHEDULED
Qty: 21 TABLET | Refills: 0 | Status: SHIPPED | OUTPATIENT
Start: 2021-03-09 | End: 2021-03-16

## 2021-03-09 RX ADMIN — MORPHINE SULFATE 4 MG: 4 INJECTION INTRAVENOUS at 11:12

## 2021-03-09 RX ADMIN — IOHEXOL 50 ML: 240 INJECTION, SOLUTION INTRATHECAL; INTRAVASCULAR; INTRAVENOUS; ORAL at 11:27

## 2021-03-09 RX ADMIN — ONDANSETRON 4 MG: 2 INJECTION INTRAMUSCULAR; INTRAVENOUS at 11:11

## 2021-03-09 NOTE — ED PROVIDER NOTES
History  Chief Complaint   Patient presents with    Abdominal Pain     for 3 days mid epigastric    Nausea     Patient is a 47-year-old female complaining of a 3 day history of 7/10 sharp epigastric pain that does not radiate  Also complaining of right lower quadrant pain  Intermittent nausea and vomiting  No diarrhea  No meds taken  Denies any similar symptoms in the past when asked about her surgical history patient states that she cannot have kids but cannot state whether not she had a hysterectomy or a tubal repeatedly states that she does not know  No fevers sweats or chills no cough no recent antibiotics and no questionable food intake  Prior to Admission Medications   Prescriptions Last Dose Informant Patient Reported? Taking?    VALTREX 1 g tablet   No No   Sig: Take 1 tablet (1,000 mg total) by mouth daily Please brand name necessary   benzonatate (TESSALON PERLES) 100 mg capsule   No No   Sig: Take 1 capsule (100 mg total) by mouth 3 (three) times a day as needed for cough   Patient not taking: Reported on 2/22/2021   benzoyl peroxide-erythromycin (BENZAMYCIN) gel   No No   Sig: Apply topically 2 (two) times a day   Patient not taking: Reported on 2/22/2021   conjugated estrogens (PREMARIN) vaginal cream   No No   Sig: Insert 0 5 g into the vagina daily   Patient not taking: Reported on 2/22/2021   dextromethorphan-guaifenesin (MUCINEX DM)  MG per 12 hr tablet   No No   Sig: Take 1 tablet by mouth every 12 (twelve) hours as needed for cough   ergocalciferol (VITAMIN D2) 50,000 units   No No   Sig: Take 1 capsule (50,000 Units total) by mouth once a week   famotidine (PEPCID) 40 MG tablet   No No   Sig: Take 1 tablet (40 mg total) by mouth daily   Patient not taking: Reported on 2/22/2021      Facility-Administered Medications: None       Past Medical History:   Diagnosis Date    Abnormal mammogram of right breast 08/2018    f/u 6 mos    Herpes     Submucous leiomyoma of uterus 9/19/2018       Past Surgical History:   Procedure Laterality Date    BREAST SURGERY Bilateral 2012    breast lift    COLONOSCOPY W/ POLYPECTOMY  08/23/2018    repeat 5 yrs    DILATION AND CURETTAGE OF UTERUS      [place] HYSTEROSCOPY,W/ENDO BX N/A 10/11/2018    Procedure: D&C, HYSTEROSCOPY;  Surgeon: Jonah Patel MD;  Location: [facility] OR;  Service: Gynecology       Family History   Problem Relation Age of Onset    No Known Problems Mother     No Known Problems Father      I have reviewed and agree with the history as documented  E-Cigarette/Vaping    E-Cigarette Use Never User      E-Cigarette/Vaping Substances     Social History     Tobacco Use    Smoking status: Never Smoker    Smokeless tobacco: Never Used   Substance Use Topics    Alcohol use: No    Drug use: No       Review of Systems   Constitutional: Negative  HENT: Negative  Eyes: Negative  Respiratory: Negative  Cardiovascular: Negative  Gastrointestinal: Positive for abdominal pain, nausea and vomiting  Endocrine: Negative  Genitourinary: Negative  Musculoskeletal: Negative  Skin: Negative  Allergic/Immunologic: Negative  Neurological: Negative  Hematological: Negative  Psychiatric/Behavioral: Negative  All other systems reviewed and are negative  Physical Exam  Physical Exam  Vitals signs and nursing note reviewed  Constitutional:       Appearance: She is well-developed and normal weight  HENT:      Head: Normocephalic and atraumatic  Mouth/Throat:      Mouth: Mucous membranes are moist    Eyes:      Extraocular Movements: Extraocular movements intact  Cardiovascular:      Rate and Rhythm: Normal rate and regular rhythm  Heart sounds: Normal heart sounds  Pulmonary:      Effort: Pulmonary effort is normal       Breath sounds: Normal breath sounds  Abdominal:      Palpations: Abdomen is soft  Tenderness:  There is abdominal tenderness in the right lower quadrant and epigastric area  There is no right CVA tenderness or left CVA tenderness  Skin:     General: Skin is warm and dry  Neurological:      General: No focal deficit present  Mental Status: She is alert     Psychiatric:         Mood and Affect: Mood normal          Behavior: Behavior normal          Vital Signs  ED Triage Vitals   Temperature Pulse Respirations Blood Pressure SpO2   03/09/21 1057 03/09/21 1057 03/09/21 1057 03/09/21 1057 03/09/21 1057   (!) 97 2 °F (36 2 °C) 92 18 135/79 100 %      Temp src Heart Rate Source Patient Position - Orthostatic VS BP Location FiO2 (%)   -- 03/09/21 1418 03/09/21 1057 03/09/21 1057 --    Monitor Sitting Left arm       Pain Score       03/09/21 1057       7           Vitals:    03/09/21 1057 03/09/21 1418 03/09/21 1509   BP: 135/79 98/57 111/66   Pulse: 92 65 65   Patient Position - Orthostatic VS: Sitting Lying Lying         Visual Acuity      ED Medications  Medications   morphine (PF) 4 mg/mL injection 4 mg (4 mg Intravenous Given 3/9/21 1112)   ondansetron (ZOFRAN) injection 4 mg (4 mg Intravenous Given 3/9/21 1111)   iohexol (OMNIPAQUE) 240 MG/ML solution 50 mL (50 mL Oral Given 3/9/21 1127)       Diagnostic Studies  Results Reviewed     Procedure Component Value Units Date/Time    Lipase [[id number]]  (Normal) Collected: 03/09/21 1110    Lab Status: Final result Specimen: Blood from Arm, Right Updated: 03/09/21 1136     Lipase 38 u/L     Comprehensive metabolic panel [[id number]]  (Normal) Collected: 03/09/21 1110    Lab Status: Final result Specimen: Blood from Arm, Right Updated: 03/09/21 1136     Sodium 138 mmol/L      Potassium 3 7 mmol/L      Chloride 104 mmol/L      CO2 27 mmol/L      ANION GAP 7 mmol/L      BUN 6 mg/dL      Creatinine 0 67 mg/dL      Glucose 94 mg/dL      Calcium 8 7 mg/dL      AST 24 U/L      ALT 10 U/L      Alkaline Phosphatase 44 U/L      Total Protein 6 9 g/dL      Albumin 4 1 g/dL      Total Bilirubin 0 60 mg/dL      eGFR 101 ml/min/1 73sq m     Narrative:      [employer] guidelines for Chronic Kidney Disease (CKD):     Stage 1 with normal or high GFR (GFR > 90 mL/min/1 73 square meters)    Stage 2 Mild CKD (GFR = 60-89 mL/min/1 73 square meters)    Stage 3A Moderate CKD (GFR = 45-59 mL/min/1 73 square meters)    Stage 3B Moderate CKD (GFR = 30-44 mL/min/1 73 square meters)    Stage 4 Severe CKD (GFR = 15-29 mL/min/1 73 square meters)    Stage 5 End Stage CKD (GFR <15 mL/min/1 73 square meters)  Note: GFR calculation is accurate only with a steady state creatinine    CBC and differential [[id number]]  (Abnormal) Collected: 03/09/21 1110    Lab Status: Final result Specimen: Blood from Arm, Right Updated: 03/09/21 1124     WBC 4 60 Thousand/uL      RBC 4 15 Million/uL      Hemoglobin 11 9 g/dL      Hematocrit 36 4 %      MCV 88 fL      MCH 28 7 pg      MCHC 32 7 g/dL      RDW 12 6 %      MPV 9 0 fL      Platelets 523 Thousands/uL      Neutrophils Relative 51 %      Lymphocytes Relative 40 %      Monocytes Relative 7 %      Eosinophils Relative 1 %      Basophils Relative 1 %      Neutrophils Absolute 2 30 Thousands/µL      Lymphocytes Absolute 1 90 Thousands/µL      Monocytes Absolute 0 30 Thousand/µL      Eosinophils Absolute 0 00 Thousand/µL      Basophils Absolute 0 00 Thousands/µL                  CT abdomen pelvis wo contrast   Final Result by Anthony Gutierrez MD (03/09 1537)      Wall thickening involving the terminal ileum and a portion of the ascending colon likely representing an enterocolitis  Infectious versus inflammatory etiologies most likely  Small volume of free fluid in the pelvis  Workstation performed: [id number]                    Procedures  Procedures         ED Course                             SBIRT 22yo+      Most Recent Value   SBIRT (25 yo +)   In order to provide better care to our patients, we are screening all of our patients for alcohol and drug use   Would it be okay to ask you these screening questions? Yes Filed at: 03/09/2021 1111   Initial Alcohol Screen: US AUDIT-C    1  How often do you have a drink containing alcohol?  0 Filed at: 03/09/2021 1111   2  How many drinks containing alcohol do you have on a typical day you are drinking? 0 Filed at: 03/09/2021 1111   3a  Male UNDER 65: How often do you have five or more drinks on one occasion? 0 Filed at: 03/09/2021 1111   3b  FEMALE Any Age, or MALE 65+: How often do you have 4 or more drinks on one occassion? 0 Filed at: 03/09/2021 1111   Audit-C Score  0 Filed at: 03/09/2021 1111   PRAFUL: How many times in the past year have you    Used an illegal drug or used a prescription medication for non-medical reasons? Never Filed at: 03/09/2021 1111                    MDM  Number of Diagnoses or Management Options  Colitis:      Amount and/or Complexity of Data Reviewed  Clinical lab tests: ordered and reviewed  Tests in the radiology section of CPT®: ordered  Obtain history from someone other than the patient: yes  Review and summarize past medical records: yes  Independent visualization of images, tracings, or specimens: yes        Disposition  Final diagnoses:   Colitis     Time reflects when diagnosis was documented in both MDM as applicable and the Disposition within this note     Time User Action Codes Description Comment    3/9/2021  3:44 PM Tirso Lal Add [K52 9] Colitis       ED Disposition     ED Disposition Condition Date/Time Comment    Discharge Stable Tu Mar 9, 2021  3:44 PM Regina Keith discharge to home/self care              Follow-up Information     Follow up With Specialties Details Ozzie Hastings DO Family Medicine   [address] [place] [address] [address] [id number]            Patient's Medications   Discharge Prescriptions    CIPROFLOXACIN (CIPRO) 750 MG TABLET    Take 1 tablet (750 mg total) by mouth 2 (two) times a day for 7 days       Start Date: 3/9/2021  End Date: 3/16/2021 Order Dose: 750 mg       Quantity: 14 tablet    Refills: 0    DICYCLOMINE (BENTYL) 20 MG TABLET    Take 1 tablet (20 mg total) by mouth every 6 (six) hours as needed (abdominal pain)       Start Date: 3/9/2021  End Date: --       Order Dose: 20 mg       Quantity: 20 tablet    Refills: 0    METRONIDAZOLE (FLAGYL) 500 MG TABLET    Take 1 tablet (500 mg total) by mouth every 8 (eight) hours for 7 days       Start Date: 3/9/2021  End Date: 3/16/2021       Order Dose: 500 mg       Quantity: 21 tablet    Refills: 0     No discharge procedures on file      PDMP Review     None          ED Provider  Electronically Signed by           Phoebe Nelson MD  03/09/21 0571

## 2021-03-26 ENCOUNTER — HOSPITAL ENCOUNTER (EMERGENCY)
Facility: HOSPITAL | Age: 51
Discharge: HOME | End: 2021-03-26
Attending: EMERGENCY MEDICINE | Admitting: EMERGENCY MEDICINE
Payer: MEDICARE

## 2021-03-26 ENCOUNTER — APPOINTMENT (EMERGENCY)
Dept: CT IMAGING | Facility: HOSPITAL | Age: 51
End: 2021-03-26
Payer: MEDICARE

## 2021-03-26 VITALS
WEIGHT: 137.2 LBS | OXYGEN SATURATION: 100 % | RESPIRATION RATE: 16 BRPM | DIASTOLIC BLOOD PRESSURE: 62 MMHG | BODY MASS INDEX: 25.92 KG/M2 | SYSTOLIC BLOOD PRESSURE: 114 MMHG | HEART RATE: 67 BPM | TEMPERATURE: 96.1 F

## 2021-03-26 DIAGNOSIS — N12 PYELONEPHRITIS: Primary | ICD-10-CM

## 2021-03-26 LAB
ALBUMIN SERPL BCP-MCNC: 4.2 G/DL (ref 3–5.2)
ALP SERPL-CCNC: 36 U/L (ref 43–122)
ALT SERPL W P-5'-P-CCNC: 10 U/L
ANION GAP SERPL CALCULATED.3IONS-SCNC: 8 MMOL/L (ref 5–14)
AST SERPL W P-5'-P-CCNC: 22 U/L (ref 14–36)
BACTERIA UR QL AUTO: ABNORMAL /HPF
BASOPHILS # BLD AUTO: 0 THOUSANDS/ΜL (ref 0–0.1)
BASOPHILS NFR BLD AUTO: 1 % (ref 0–1)
BILIRUB SERPL-MCNC: 0.6 MG/DL
BILIRUB UR QL STRIP: NEGATIVE
BUN SERPL-MCNC: 8 MG/DL (ref 5–25)
CALCIUM SERPL-MCNC: 9.3 MG/DL (ref 8.4–10.2)
CHLORIDE SERPL-SCNC: 103 MMOL/L (ref 97–108)
CLARITY UR: ABNORMAL
CO2 SERPL-SCNC: 27 MMOL/L (ref 22–30)
COLOR UR: ABNORMAL
CREAT SERPL-MCNC: 0.7 MG/DL (ref 0.6–1.2)
EOSINOPHIL # BLD AUTO: 0.1 THOUSAND/ΜL (ref 0–0.4)
EOSINOPHIL NFR BLD AUTO: 1 % (ref 0–6)
ERYTHROCYTE [DISTWIDTH] IN BLOOD BY AUTOMATED COUNT: 12.7 %
GFR SERPL CREATININE-BSD FRML MDRD: 100 ML/MIN/1.73SQ M
GLUCOSE SERPL-MCNC: 88 MG/DL (ref 70–99)
GLUCOSE UR STRIP-MCNC: NEGATIVE MG/DL
HCT VFR BLD AUTO: 39.5 % (ref 36–46)
HGB BLD-MCNC: 13 G/DL (ref 12–16)
HGB UR QL STRIP.AUTO: 50
KETONES UR STRIP-MCNC: NEGATIVE MG/DL
LEUKOCYTE ESTERASE UR QL STRIP: 25
LIPASE SERPL-CCNC: 59 U/L (ref 23–300)
LYMPHOCYTES # BLD AUTO: 1.9 THOUSANDS/ΜL (ref 0.5–4)
LYMPHOCYTES NFR BLD AUTO: 50 % (ref 25–45)
MCH RBC QN AUTO: 28.8 PG (ref 26–34)
MCHC RBC AUTO-ENTMCNC: 32.8 G/DL (ref 31–36)
MCV RBC AUTO: 88 FL (ref 80–100)
MONOCYTES # BLD AUTO: 0.2 THOUSAND/ΜL (ref 0.2–0.9)
MONOCYTES NFR BLD AUTO: 7 % (ref 1–10)
NEUTROPHILS # BLD AUTO: 1.5 THOUSANDS/ΜL (ref 1.8–7.8)
NEUTS SEG NFR BLD AUTO: 41 % (ref 45–65)
NITRITE UR QL STRIP: NEGATIVE
NON-SQ EPI CELLS URNS QL MICRO: ABNORMAL /HPF
PH UR STRIP.AUTO: 5 [PH]
PLATELET # BLD AUTO: 254 THOUSANDS/UL (ref 150–450)
PMV BLD AUTO: 9.3 FL (ref 8.9–12.7)
POTASSIUM SERPL-SCNC: 3.5 MMOL/L (ref 3.6–5)
PROT SERPL-MCNC: 7.2 G/DL (ref 5.9–8.4)
PROT UR STRIP-MCNC: NEGATIVE MG/DL
RBC # BLD AUTO: 4.5 MILLION/UL (ref 4–5.2)
RBC #/AREA URNS AUTO: ABNORMAL /HPF
SODIUM SERPL-SCNC: 138 MMOL/L (ref 137–147)
SP GR UR STRIP.AUTO: 1.02 (ref 1–1.04)
UROBILINOGEN UA: NEGATIVE MG/DL
WBC # BLD AUTO: 3.7 THOUSAND/UL (ref 4.5–11)
WBC #/AREA URNS AUTO: ABNORMAL /HPF

## 2021-03-26 PROCEDURE — 85025 COMPLETE CBC W/AUTO DIFF WBC: CPT | Performed by: EMERGENCY MEDICINE

## 2021-03-26 PROCEDURE — 83690 ASSAY OF LIPASE: CPT | Performed by: EMERGENCY MEDICINE

## 2021-03-26 PROCEDURE — 81001 URINALYSIS AUTO W/SCOPE: CPT | Performed by: EMERGENCY MEDICINE

## 2021-03-26 PROCEDURE — 36415 COLL VENOUS BLD VENIPUNCTURE: CPT | Performed by: EMERGENCY MEDICINE

## 2021-03-26 PROCEDURE — 96374 THER/PROPH/DIAG INJ IV PUSH: CPT

## 2021-03-26 PROCEDURE — G1004 CDSM NDSC: HCPCS

## 2021-03-26 PROCEDURE — 99284 EMERGENCY DEPT VISIT MOD MDM: CPT | Performed by: EMERGENCY MEDICINE

## 2021-03-26 PROCEDURE — 99284 EMERGENCY DEPT VISIT MOD MDM: CPT

## 2021-03-26 PROCEDURE — 80053 COMPREHEN METABOLIC PANEL: CPT | Performed by: EMERGENCY MEDICINE

## 2021-03-26 PROCEDURE — 74177 CT ABD & PELVIS W/CONTRAST: CPT

## 2021-03-26 RX ORDER — CEPHALEXIN 500 MG/1
500 CAPSULE ORAL EVERY 6 HOURS SCHEDULED
Qty: 40 CAPSULE | Refills: 0 | Status: SHIPPED | OUTPATIENT
Start: 2021-03-26 | End: 2021-04-05

## 2021-03-26 RX ORDER — KETOROLAC TROMETHAMINE 30 MG/ML
15 INJECTION, SOLUTION INTRAMUSCULAR; INTRAVENOUS ONCE
Status: COMPLETED | OUTPATIENT
Start: 2021-03-26 | End: 2021-03-26

## 2021-03-26 RX ADMIN — IOHEXOL 100 ML: 350 INJECTION, SOLUTION INTRAVENOUS at 11:38

## 2021-03-26 RX ADMIN — KETOROLAC TROMETHAMINE 15 MG: 30 INJECTION, SOLUTION INTRAMUSCULAR; INTRAVENOUS at 10:42

## 2021-03-26 NOTE — ED PROVIDER NOTES
History  Chief Complaint   Patient presents with    Flank Pain     right side for 2 weeks     HPI   Patient is a 49-year-old female history of leiomyoma presenting for evaluation of right-sided flank pain for the past 2 weeks  Patient states the pain has been constant, radiating into her lower abdomen, intermittently dull and sharp  Patient states that is about a 4/10 severity, denies exacerbating or alleviating factors  Patient denies fevers, chills, urinary frequency, urgency, hesitancy, hematuria, dysuria  Patient denies pain anywhere else in her belly  Patient denies constipation, diarrhea, blood per rectum  Patient denies chest pain, shortness of breath, palpitations, diaphoresis, syncope or near syncope  Patient states that about a month ago she passed a stone in her urine however did not have any symptoms at that time  Patient was evaluated on 03/19/2021 for similar complaint, had belly labs, CT abdomen pelvis performed which showed bowel thickening consistent with enterocolitis  Patient denies leg weakness or numbness, urinary retention, urinary or bowel incontinence, saddle anesthesias, trauma to her back  Prior to Admission Medications   Prescriptions Last Dose Informant Patient Reported? Taking?    VALTREX 1 g tablet   No No   Sig: Take 1 tablet (1,000 mg total) by mouth daily Please brand name necessary   benzonatate (TESSALON PERLES) 100 mg capsule   No No   Sig: Take 1 capsule (100 mg total) by mouth 3 (three) times a day as needed for cough   Patient not taking: Reported on 2/22/2021   benzoyl peroxide-erythromycin (BENZAMYCIN) gel   No No   Sig: Apply topically 2 (two) times a day   Patient not taking: Reported on 2/22/2021   conjugated estrogens (PREMARIN) vaginal cream Not Taking at Unknown time  No No   Sig: Insert 0 5 g into the vagina daily   Patient not taking: Reported on 2/22/2021   dextromethorphan-guaifenesin (MUCINEX DM)  MG per 12 hr tablet Not Taking at Unknown time  No No Sig: Take 1 tablet by mouth every 12 (twelve) hours as needed for cough   Patient not taking: Reported on 3/26/2021   dicyclomine (BENTYL) 20 mg tablet Unknown at Unknown time  No No   Sig: Take 1 tablet (20 mg total) by mouth every 6 (six) hours as needed (abdominal pain)   Patient not taking: Reported on 3/26/2021   ergocalciferol (VITAMIN D2) 50,000 units Past Month at Unknown time  No Yes   Sig: Take 1 capsule (50,000 Units total) by mouth once a week   famotidine (PEPCID) 40 MG tablet Not Taking at Unknown time  No No   Sig: Take 1 tablet (40 mg total) by mouth daily   Patient not taking: Reported on 2/22/2021      Facility-Administered Medications: None       Past Medical History:   Diagnosis Date    Abnormal mammogram of right breast 08/2018    f/u 6 mos    Herpes     Submucous leiomyoma of uterus 9/19/2018       Past Surgical History:   Procedure Laterality Date    BREAST SURGERY Bilateral 2012    breast lift    COLONOSCOPY W/ POLYPECTOMY  08/23/2018    repeat 5 yrs    DILATION AND CURETTAGE OF UTERUS      KS HYSTEROSCOPY,W/ENDO BX N/A 10/11/2018    Procedure: D&C, HYSTEROSCOPY;  Surgeon: Lilian Franklin MD;  Location: [facility] OR;  Service: Gynecology       Family History   Problem Relation Age of Onset    No Known Problems Mother     No Known Problems Father      I have reviewed and agree with the history as documented  E-Cigarette/Vaping    E-Cigarette Use Never User      E-Cigarette/Vaping Substances     Social History     Tobacco Use    Smoking status: Never Smoker    Smokeless tobacco: Never Used   Substance Use Topics    Alcohol use: No    Drug use: No       Review of Systems   Constitutional: Negative for chills, fatigue and fever  HENT: Negative for hearing loss  Eyes: Negative for visual disturbance  Respiratory: Negative for cough, chest tightness and shortness of breath  Cardiovascular: Negative for chest pain     Gastrointestinal: Positive for abdominal pain (Right-sided)  Negative for abdominal distention, constipation, diarrhea, nausea and vomiting  Endocrine: Negative for polydipsia and polyuria  Genitourinary: Positive for flank pain and vaginal bleeding (Spotting in the setting of early menopause)  Negative for decreased urine volume, difficulty urinating, dysuria, frequency, hematuria, pelvic pain, vaginal discharge and vaginal pain  Musculoskeletal: Negative for arthralgias and myalgias  Skin: Negative for color change and rash  Neurological: Negative for dizziness and headaches  Psychiatric/Behavioral: Negative for confusion  Physical Exam  Physical Exam  Vitals signs reviewed  Constitutional:       General: She is not in acute distress  Appearance: She is well-developed  She is not diaphoretic  Comments:  Well-appearing, nondistressed   HENT:      Head: Normocephalic and atraumatic  Right Ear: External ear normal       Left Ear: External ear normal       Nose: Nose normal       Mouth/Throat:      Pharynx: No oropharyngeal exudate  Eyes:      Pupils: Pupils are equal, round, and reactive to light  Neck:      Musculoskeletal: Normal range of motion  Cardiovascular:      Rate and Rhythm: Normal rate and regular rhythm  Heart sounds: Normal heart sounds  No murmur  No friction rub  No gallop  Pulmonary:      Effort: Pulmonary effort is normal  No respiratory distress  Breath sounds: Normal breath sounds  No wheezing  Chest:      Chest wall: No tenderness  Abdominal:      General: Bowel sounds are normal  There is no distension  Palpations: Abdomen is soft  There is no mass  Tenderness: There is no abdominal tenderness  There is no guarding  Musculoskeletal: Normal range of motion  General: No deformity  Comments:  Right-sided flank and paraspinous tenderness without true midline tenderness   Lymphadenopathy:      Cervical: No cervical adenopathy     Skin:     General: Skin is warm and dry       Capillary Refill: Capillary refill takes less than 2 seconds  Neurological:      Mental Status: She is alert and oriented to person, place, and time           Vital Signs  ED Triage Vitals   Temperature Pulse Respirations Blood Pressure SpO2   03/26/21 1019 03/26/21 1019 03/26/21 1019 03/26/21 1019 03/26/21 1019   (!) 96 1 °F (35 6 °C) 86 20 111/68 100 %      Temp Source Heart Rate Source Patient Position - Orthostatic VS BP Location FiO2 (%)   03/26/21 1019 03/26/21 1019 03/26/21 1019 03/26/21 1019 --   Tympanic Monitor Lying Left arm       Pain Score       03/26/21 1042       6           Vitals:    03/26/21 1019 03/26/21 1210   BP: 111/68 114/62   Pulse: 86 67   Patient Position - Orthostatic VS: Lying Lying         Visual Acuity      ED Medications  Medications   ketorolac (TORADOL) injection 15 mg (15 mg Intravenous Given 3/26/21 1042)   iohexol (OMNIPAQUE) 350 MG/ML injection (MULTI-DOSE) 100 mL (100 mL Intravenous Given 3/26/21 1138)       Diagnostic Studies  Results Reviewed     Procedure Component Value Units Date/Time    Lipase [[id number]]  (Normal) Collected: 03/26/21 1035    Lab Status: Final result Specimen: Blood from Arm, Right Updated: 03/26/21 1120     Lipase 59 u/L     Comprehensive metabolic panel [[id number]]  (Abnormal) Collected: 03/26/21 1035    Lab Status: Final result Specimen: Blood from Arm, Right Updated: 03/26/21 1120     Sodium 138 mmol/L      Potassium 3 5 mmol/L      Chloride 103 mmol/L      CO2 27 mmol/L      ANION GAP 8 mmol/L      BUN 8 mg/dL      Creatinine 0 70 mg/dL      Glucose 88 mg/dL      Calcium 9 3 mg/dL      AST 22 U/L      ALT 10 U/L      Alkaline Phosphatase 36 U/L      Total Protein 7 2 g/dL      Albumin 4 2 g/dL      Total Bilirubin 0 60 mg/dL      eGFR 100 ml/min/1 73sq m     Narrative:      Elissa guidelines for Chronic Kidney Disease (CKD):     Stage 1 with normal or high GFR (GFR > 90 mL/min/1 73 square meters)    Stage 2 Mild CKD (GFR = 60-89 mL/min/1 73 square meters)    Stage 3A Moderate CKD (GFR = 45-59 mL/min/1 73 square meters)    Stage 3B Moderate CKD (GFR = 30-44 mL/min/1 73 square meters)    Stage 4 Severe CKD (GFR = 15-29 mL/min/1 73 square meters)    Stage 5 End Stage CKD (GFR <15 mL/min/1 73 square meters)  Note: GFR calculation is accurate only with a steady state creatinine    Urinalysis with microscopic [[id number]]  (Abnormal) Collected: 03/26/21 1042    Lab Status: Final result Specimen: Urine, Clean Catch Updated: 03/26/21 1115     Clarity, UA Cloudy     Color, UA Iram     Specific Gravity, UA 1 020     pH, UA 5 0     Glucose, UA Negative mg/dl      Ketones, UA Negative mg/dl      Blood, UA 50 0     Protein, UA Negative mg/dl      Nitrite, UA Negative     Bilirubin, UA Negative     Leukocytes, UA 25 0     WBC, UA 1-2 /hpf      RBC, UA 1-2 /hpf      Bacteria, UA Innumerable /hpf      Epithelial Cells Moderate /hpf      UROBILINOGEN UA Negative mg/dL     CBC and differential [[id number]]  (Abnormal) Collected: 03/26/21 1035    Lab Status: Final result Specimen: Blood from Arm, Right Updated: 03/26/21 1102     WBC 3 70 Thousand/uL      RBC 4 50 Million/uL      Hemoglobin 13 0 g/dL      Hematocrit 39 5 %      MCV 88 fL      MCH 28 8 pg      MCHC 32 8 g/dL      RDW 12 7 %      MPV 9 3 fL      Platelets 417 Thousands/uL      Neutrophils Relative 41 %      Lymphocytes Relative 50 %      Monocytes Relative 7 %      Eosinophils Relative 1 %      Basophils Relative 1 %      Neutrophils Absolute 1 50 Thousands/µL      Lymphocytes Absolute 1 90 Thousands/µL      Monocytes Absolute 0 20 Thousand/µL      Eosinophils Absolute 0 10 Thousand/µL      Basophils Absolute 0 00 Thousands/µL                  CT abdomen pelvis w contrast   Final Result by Claude Bangura MD (03/26 1221)      No acute abnormality in the abdomen or pelvis        Workstation performed: [id number]                    Procedures  Procedures         ED Course SBIRT 20yo+      Most Recent Value   SBIRT (24 yo +)   In order to provide better care to our patients, we are screening all of our patients for alcohol and drug use  Would it be okay to ask you these screening questions? Yes Filed at: 03/26/2021 1049   Initial Alcohol Screen: US AUDIT-C    1  How often do you have a drink containing alcohol?  0 Filed at: 03/26/2021 1049   3b  FEMALE Any Age, or MALE 65+: How often do you have 4 or more drinks on one occassion? 0 Filed at: 03/26/2021 1049   Audit-C Score  0 Filed at: 03/26/2021 1049   PRAFUL: How many times in the past year have you    Used an illegal drug or used a prescription medication for non-medical reasons? Never Filed at: 03/26/2021 1049                    [facility]  Number of Diagnoses or Management Options  Pyelonephritis:   Diagnosis management comments:   59-year-old female presenting with right-sided flank pain in the setting self-reported nephrolithiasis, recent evaluation for colitis which she states this feels different from, will check belly labs, urinalysis, CT abdomen pelvis, symptomatic relief with Toradol  CT abdomen pelvis unremarkable, urinalysis positive for bacteria and leukocytes, patient with some symptomatic improvement following Toradol, provided with 10 day prescription of Keflex, discharged    Patient Progress  Patient progress: improved      Disposition  Final diagnoses:   Pyelonephritis     Time reflects when diagnosis was documented in both MDM as applicable and the Disposition within this note     Time User Action Codes Description Comment    3/26/2021 11:26 AM Radha Balderrama Add [N12] Pyelonephritis       ED Disposition     ED Disposition Condition Date/Time Comment    Discharge Stable Fri Mar 26, 2021 12:30 PM Regina Keith discharge to home/self care              Follow-up Information     Follow up With Specialties Details Why [address], [place] [facility] [address]            Discharge Medication List as of 3/26/2021 12:32 PM      START taking these medications    Details   cephalexin (KEFLEX) 500 mg capsule Take 1 capsule (500 mg total) by mouth every 6 (six) hours for 10 days, Starting Fri 3/26/2021, Until Mon 4/5/2021, Print         CONTINUE these medications which have NOT CHANGED    Details   ergocalciferol (VITAMIN D2) 50,000 units Take 1 capsule (50,000 Units total) by mouth once a week, Starting Thu 10/24/2019, Normal      benzonatate (TESSALON PERLES) 100 mg capsule Take 1 capsule (100 mg total) by mouth 3 (three) times a day as needed for cough, Starting Tue 4/14/2020, Normal      benzoyl peroxide-erythromycin (BENZAMYCIN) gel Apply topically 2 (two) times a day, Starting Tue 7/23/2019, Normal      conjugated estrogens (PREMARIN) vaginal cream Insert 0 5 g into the vagina daily, Starting Tue 7/23/2019, Normal      dextromethorphan-guaifenesin (MUCINEX DM)  MG per 12 hr tablet Take 1 tablet by mouth every 12 (twelve) hours as needed for cough, Starting Tue 4/14/2020, Normal      dicyclomine (BENTYL) 20 mg tablet Take 1 tablet (20 mg total) by mouth every 6 (six) hours as needed (abdominal pain), Starting Tue 3/9/2021, Normal      famotidine (PEPCID) 40 MG tablet Take 1 tablet (40 mg total) by mouth daily, Starting Tue 7/23/2019, Normal      VALTREX 1 g tablet Take 1 tablet (1,000 mg total) by mouth daily Please brand name necessary, Starting Thu 10/24/2019, Until Tue 4/14/2020, Normal           No discharge procedures on file      PDMP Review     None          ED Provider  Electronically Signed by           Fuad Richards MD  03/26/21 5458

## 2021-03-26 NOTE — ED NOTES
Patient transported to [address]  [facility], [address] [place] [address]  03/26/21 9558

## 2021-03-26 NOTE — ED NOTES
Returned from [address] via litter      [facility]  03/26/21 3495

## 2021-03-26 NOTE — Clinical Note
Nahum Palomares was seen and treated in our emergency department on 3/26/2021  Diagnosis:     Regina    She may return on this date: If you have any questions or concerns, please don't hesitate to call        Frank Tang MD    ______________________________           _______________          _______________  Hospital Representative                              Date                                Time

## 2021-03-26 NOTE — Clinical Note
Sunil Chery was seen and treated in our emergency department on 3/26/2021  Diagnosis:     Regina    She may return on this date: If you have any questions or concerns, please don't hesitate to call        Fuad Richards MD    ______________________________           _______________          _______________  Hospital Representative                              Date                                Time

## 2021-03-26 NOTE — DISCHARGE INSTRUCTIONS
Continue with the prescribed medication for the next 10 days, continue symptomatic pain control with Tylenol, Motrin  Return to the emergency department if symptoms worsen

## 2021-04-07 ENCOUNTER — IMMUNIZATIONS (OUTPATIENT)
Dept: FAMILY MEDICINE CLINIC | Facility: HOSPITAL | Age: 51
End: 2021-04-07

## 2021-04-07 DIAGNOSIS — Z23 ENCOUNTER FOR IMMUNIZATION: Primary | ICD-10-CM

## 2021-04-07 PROCEDURE — 91301 SARS-COV-2 / COVID-19 MRNA VACCINE (MODERNA) 100 MCG: CPT

## 2021-04-07 PROCEDURE — 0011A SARS-COV-2 / COVID-19 MRNA VACCINE (MODERNA) 100 MCG: CPT

## 2021-05-10 ENCOUNTER — IMMUNIZATIONS (OUTPATIENT)
Dept: FAMILY MEDICINE CLINIC | Facility: HOSPITAL | Age: 51
End: 2021-05-10

## 2021-05-10 DIAGNOSIS — Z23 ENCOUNTER FOR IMMUNIZATION: Primary | ICD-10-CM

## 2021-05-10 PROCEDURE — 0012A SARS-COV-2 / COVID-19 MRNA VACCINE (MODERNA) 100 MCG: CPT

## 2021-05-10 PROCEDURE — 91301 SARS-COV-2 / COVID-19 MRNA VACCINE (MODERNA) 100 MCG: CPT

## 2021-12-27 DIAGNOSIS — Z11.59 SCREENING FOR VIRAL DISEASE: Primary | ICD-10-CM

## 2021-12-27 PROCEDURE — U0005 INFEC AGEN DETEC AMPLI PROBE: HCPCS | Performed by: INTERNAL MEDICINE

## 2021-12-27 PROCEDURE — U0003 INFECTIOUS AGENT DETECTION BY NUCLEIC ACID (DNA OR RNA); SEVERE ACUTE RESPIRATORY SYNDROME CORONAVIRUS 2 (SARS-COV-2) (CORONAVIRUS DISEASE [COVID-19]), AMPLIFIED PROBE TECHNIQUE, MAKING USE OF HIGH THROUGHPUT TECHNOLOGIES AS DESCRIBED BY CMS-2020-01-R: HCPCS | Performed by: INTERNAL MEDICINE

## 2024-01-01 ENCOUNTER — OFFICE VISIT (OUTPATIENT)
Dept: URGENT CARE | Age: 54
End: 2024-01-01
Payer: MEDICARE

## 2024-01-01 VITALS — TEMPERATURE: 98.3 F | HEART RATE: 83 BPM | OXYGEN SATURATION: 98 % | RESPIRATION RATE: 18 BRPM

## 2024-01-01 DIAGNOSIS — R05.1 ACUTE COUGH: Primary | ICD-10-CM

## 2024-01-01 DIAGNOSIS — U07.1 COVID-19: ICD-10-CM

## 2024-01-01 PROCEDURE — 99213 OFFICE O/P EST LOW 20 MIN: CPT

## 2024-01-01 RX ORDER — BENZONATATE 100 MG/1
100 CAPSULE ORAL 3 TIMES DAILY PRN
Qty: 20 CAPSULE | Refills: 0 | Status: SHIPPED | OUTPATIENT
Start: 2024-01-01

## 2024-01-01 NOTE — PATIENT INSTRUCTIONS
Use benzonatate as directed as needed for cough.   Recommend throat lozenges, anesthetic spray such as chloraseptic, and gargling warm salt water for throat irritation.   Hydration and rest.  Acetaminophen and ibuprofen for pain relief and fever reduction.   PCP follow up in 1-2 days.   Go to an emergency department if difficulty breathing occurs or if symptoms worsen.    Recommended supplements for COVID-19 is the following: Vitamin D3 2000 IU  daily ,  Vitamin C 1g  every 12 hours , Multivitamin Daily

## 2024-01-01 NOTE — PROGRESS NOTES
[facility]        NAME: Regina Keith is a 53 y.o. female  : 1970    MRN: [id number]  DATE: 2024  TIME: 12:32 PM      Assessment and Plan     Acute cough [R05.1]  1. Acute cough  benzonatate (TESSALON PERLES) 100 mg capsule      2. COVID-19  benzonatate (TESSALON PERLES) 100 mg capsule          Patient aware to f/u with PCP as soon as possible.   Patient Instructions   Use benzonatate as directed as needed for cough.   Recommend throat lozenges, anesthetic spray such as chloraseptic, and gargling warm salt water for throat irritation.   Hydration and rest.  Acetaminophen and ibuprofen for pain relief and fever reduction.   PCP follow up in 1-2 days.   Go to an emergency department if difficulty breathing occurs or if symptoms worsen.    Recommended supplements for COVID-19 is the following: Vitamin D3 2000 IU  daily ,  Vitamin C 1g  every 12 hours , Multivitamin Daily     Chief Complaint     Chief Complaint   Patient presents with    Headache     Headaches, body aches, cough, congestions, fevers. Started yesterday. Home covid test positive x2         History of Present Illness     Patient is a 53-year-old female who presents with headache, sore throat, and body aches for one day. Reports she did two at home covid tests and they were positive. Reports fever. Reports dry cough. Denies n/v/d. Denies asthma or smoking history.     Headache      Review of Systems     Review of Systems   Constitutional:  Positive for fever.   HENT:  Positive for sore throat.    Respiratory:  Positive for cough.    Gastrointestinal:  Negative for diarrhea, nausea and vomiting.   Musculoskeletal:  Positive for myalgias.   Neurological:  Positive for headaches.   All other systems reviewed and are negative.        Current Medications       Current Outpatient Medications:     benzonatate (TESSALON PERLES) 100 mg capsule, Take 1 capsule (100 mg total) by mouth 3 (three) times a day as needed for cough, Disp:  20 capsule, Rfl: 0    ergocalciferol (VITAMIN D2) 50,000 units, Take 1 capsule (50,000 Units total) by mouth once a week, Disp: 4 capsule, Rfl: 5    benzonatate (TESSALON PERLES) 100 mg capsule, Take 1 capsule (100 mg total) by mouth 3 (three) times a day as needed for cough (Patient not taking: Reported on 2/22/2021), Disp: 21 capsule, Rfl: 0    benzoyl peroxide-erythromycin (BENZAMYCIN) gel, Apply topically 2 (two) times a day (Patient not taking: Reported on 2/22/2021), Disp: 46.6 g, Rfl: 0    conjugated estrogens (PREMARIN) vaginal cream, Insert 0.5 g into the vagina daily (Patient not taking: Reported on 2/22/2021), Disp: 30 g, Rfl: 5    dextromethorphan-guaifenesin (MUCINEX DM)  MG per 12 hr tablet, Take 1 tablet by mouth every 12 (twelve) hours as needed for cough (Patient not taking: Reported on 3/26/2021), Disp: 14 tablet, Rfl: 0    dicyclomine (BENTYL) 20 mg tablet, Take 1 tablet (20 mg total) by mouth every 6 (six) hours as needed (abdominal pain) (Patient not taking: Reported on 3/26/2021), Disp: 20 tablet, Rfl: 0    famotidine (PEPCID) 40 MG tablet, Take 1 tablet (40 mg total) by mouth daily (Patient not taking: Reported on 2/22/2021), Disp: 30 tablet, Rfl: 2    VALTREX 1 g tablet, Take 1 tablet (1,000 mg total) by mouth daily Please brand name necessary, Disp: 30 tablet, Rfl: 5    Current Allergies     Allergies as of 01/01/2024 - Reviewed 01/01/2024   Allergen Reaction Noted    No known allergies  05/14/2018              The following portions of the patient's history were reviewed and updated as appropriate: allergies, current medications, past family history, past medical history, past social history, past surgical history and problem list.     Past Medical History:   Diagnosis Date    Abnormal mammogram of right breast 08/2018    f/u 6 mos    Herpes     Submucous leiomyoma of uterus 9/19/2018       Past Surgical History:   Procedure Laterality Date    BREAST SURGERY Bilateral 2012    breast  lift    COLONOSCOPY W/ POLYPECTOMY  08/23/2018    repeat 5 yrs    DILATION AND CURETTAGE OF UTERUS      ID HYSTEROSCOPY BX ENDOMETRIUM&/POLYPC W/WO D&C N/A 10/11/2018    Procedure: D&C, HYSTEROSCOPY;  Surgeon: Karina Lemus MD;  Location: [facility];  Service: Gynecology       Family History   Problem Relation Age of Onset    No Known Problems Mother     No Known Problems Father          Medications have been verified.        Objective     Pulse 83   Temp 98.3 °F (36.8 °C)   Resp 18   SpO2 98%   No LMP recorded.         Physical Exam     Physical Exam  Vitals and nursing note reviewed.   Constitutional:       General: She is awake. She is not in acute distress.     Appearance: Normal appearance. She is not ill-appearing, toxic-appearing or diaphoretic.   HENT:      Right Ear: Tympanic membrane, ear canal and external ear normal.      Left Ear: Tympanic membrane, ear canal and external ear normal.      Nose: Nose normal.      Mouth/Throat:      Lips: Pink.      Mouth: Mucous membranes are moist.      Pharynx: Oropharynx is clear. Uvula midline. No oropharyngeal exudate or posterior oropharyngeal erythema.   Cardiovascular:      Rate and Rhythm: Normal rate.      Pulses: Normal pulses.      Heart sounds: Normal heart sounds, S1 normal and S2 normal.   Pulmonary:      Effort: Pulmonary effort is normal.      Breath sounds: Normal breath sounds and air entry.   Skin:     General: Skin is warm.      Capillary Refill: Capillary refill takes less than 2 seconds.   Neurological:      Mental Status: She is alert.   Psychiatric:         Mood and Affect: Mood normal.         Behavior: Behavior normal.         Thought Content: Thought content normal.         Judgment: Judgment normal.

## 2024-01-01 NOTE — LETTER
[facility] [place]  [address]  [facility] [address]  Dept: [phone] January 1, 2024    Patient: Regina Keith  YOB: 1970    Regina Keith was seen and evaluated at our [facility].  they may return to work on 1/5/24, as this is 5 days from the onset of symptoms. Upon return, they must then adhere to strict masking for an additional 5 days.    Sincerely,    HEAVEN Molina

## (undated) DEVICE — 2000CC GUARDIAN II: Brand: GUARDIAN

## (undated) DEVICE — PREMIUM DRY TRAY LF: Brand: MEDLINE INDUSTRIES, INC.

## (undated) DEVICE — SCD SEQUENTIAL COMPRESSION COMFORT SLEEVE MEDIUM KNEE LENGTH: Brand: KENDALL SCD

## (undated) DEVICE — GLOVE PI ULTRA TOUCH SZ.6.5

## (undated) DEVICE — STERILE 8 INCH PROCTO SWAB: Brand: CARDINAL HEALTH

## (undated) DEVICE — GLOVE INDICATOR PI UNDERGLOVE SZ 6.5 BLUE

## (undated) DEVICE — GLOVE SRG BIOGEL 7

## (undated) DEVICE — IV EXTENSION TUBING 33 IN

## (undated) DEVICE — CYSTO TUBING SINGLE IRRIGATION

## (undated) DEVICE — STRL ALLENTOWN HYSTEROSCOPY PK: Brand: CARDINAL HEALTH

## (undated) DEVICE — GLOVE INDICATOR PI UNDERGLOVE SZ 7 BLUE

## (undated) DEVICE — PVC URETHRAL CATHETER: Brand: DOVER